# Patient Record
Sex: FEMALE | Race: BLACK OR AFRICAN AMERICAN | NOT HISPANIC OR LATINO | Employment: UNEMPLOYED | URBAN - METROPOLITAN AREA
[De-identification: names, ages, dates, MRNs, and addresses within clinical notes are randomized per-mention and may not be internally consistent; named-entity substitution may affect disease eponyms.]

---

## 2023-02-24 ENCOUNTER — TRANSFERRED RECORDS (OUTPATIENT)
Dept: HEALTH INFORMATION MANAGEMENT | Facility: CLINIC | Age: 33
End: 2023-02-24

## 2023-03-21 ENCOUNTER — HOSPITAL ENCOUNTER (EMERGENCY)
Facility: CLINIC | Age: 33
Discharge: LEFT BEFORE DISCHARGE | End: 2023-03-21

## 2023-03-21 VITALS
DIASTOLIC BLOOD PRESSURE: 77 MMHG | SYSTOLIC BLOOD PRESSURE: 125 MMHG | TEMPERATURE: 98.4 F | OXYGEN SATURATION: 98 % | RESPIRATION RATE: 16 BRPM | HEART RATE: 85 BPM

## 2023-03-21 ASSESSMENT — ACTIVITIES OF DAILY LIVING (ADL)
ADLS_ACUITY_SCORE: 33

## 2023-03-21 NOTE — ED TRIAGE NOTES
PT C/O of possible STD exposure and yeast infection starting in Mid February. Pt friend in the room.       Triage Assessment     Row Name 03/21/23 0254       Triage Assessment (Adult)    Airway WDL WDL       Respiratory WDL    Respiratory WDL WDL       Skin Circulation/Temperature WDL    Skin Circulation/Temperature WDL WDL       Peripheral/Neurovascular WDL    Peripheral Neurovascular WDL WDL       Cognitive/Neuro/Behavioral WDL    Cognitive/Neuro/Behavioral WDL WDL

## 2023-03-30 ENCOUNTER — OFFICE VISIT (OUTPATIENT)
Dept: URGENT CARE | Facility: URGENT CARE | Age: 33
End: 2023-03-30

## 2023-03-30 ENCOUNTER — TELEPHONE (OUTPATIENT)
Dept: URGENT CARE | Facility: URGENT CARE | Age: 33
End: 2023-03-30

## 2023-03-30 VITALS
DIASTOLIC BLOOD PRESSURE: 80 MMHG | SYSTOLIC BLOOD PRESSURE: 138 MMHG | HEART RATE: 111 BPM | RESPIRATION RATE: 18 BRPM | TEMPERATURE: 98.5 F | OXYGEN SATURATION: 98 %

## 2023-03-30 DIAGNOSIS — N89.8 VAGINAL DISCHARGE: ICD-10-CM

## 2023-03-30 DIAGNOSIS — N73.9 PELVIC INFLAMMATORY DISEASE: Primary | ICD-10-CM

## 2023-03-30 DIAGNOSIS — Z11.3 SCREEN FOR STD (SEXUALLY TRANSMITTED DISEASE): ICD-10-CM

## 2023-03-30 DIAGNOSIS — B96.89 BACTERIAL VAGINOSIS: ICD-10-CM

## 2023-03-30 DIAGNOSIS — N76.0 BACTERIAL VAGINOSIS: ICD-10-CM

## 2023-03-30 LAB
ALBUMIN UR-MCNC: NEGATIVE MG/DL
AMORPH CRY #/AREA URNS HPF: ABNORMAL /HPF
APPEARANCE UR: ABNORMAL
BACTERIA #/AREA URNS HPF: ABNORMAL /HPF
BILIRUB UR QL STRIP: NEGATIVE
CLUE CELLS: PRESENT
COLOR UR AUTO: YELLOW
GLUCOSE UR STRIP-MCNC: NEGATIVE MG/DL
HCG UR QL: NEGATIVE
HGB UR QL STRIP: NEGATIVE
KETONES UR STRIP-MCNC: NEGATIVE MG/DL
LEUKOCYTE ESTERASE UR QL STRIP: ABNORMAL
NITRATE UR QL: NEGATIVE
PH UR STRIP: 7.5 [PH] (ref 5–7)
RBC #/AREA URNS AUTO: ABNORMAL /HPF
SP GR UR STRIP: 1.02 (ref 1–1.03)
SQUAMOUS #/AREA URNS AUTO: ABNORMAL /LPF
TRICHOMONAS, WET PREP: ABNORMAL
UROBILINOGEN UR STRIP-ACNC: 0.2 E.U./DL
WBC #/AREA URNS AUTO: ABNORMAL /HPF
WBC'S/HIGH POWER FIELD, WET PREP: ABNORMAL
YEAST, WET PREP: ABNORMAL

## 2023-03-30 PROCEDURE — 99204 OFFICE O/P NEW MOD 45 MIN: CPT | Mod: 25 | Performed by: PHYSICIAN ASSISTANT

## 2023-03-30 PROCEDURE — 81025 URINE PREGNANCY TEST: CPT | Performed by: PHYSICIAN ASSISTANT

## 2023-03-30 PROCEDURE — 87210 SMEAR WET MOUNT SALINE/INK: CPT | Performed by: PHYSICIAN ASSISTANT

## 2023-03-30 PROCEDURE — 87389 HIV-1 AG W/HIV-1&-2 AB AG IA: CPT | Performed by: PHYSICIAN ASSISTANT

## 2023-03-30 PROCEDURE — 87340 HEPATITIS B SURFACE AG IA: CPT | Performed by: PHYSICIAN ASSISTANT

## 2023-03-30 PROCEDURE — 81001 URINALYSIS AUTO W/SCOPE: CPT | Performed by: PHYSICIAN ASSISTANT

## 2023-03-30 PROCEDURE — 87591 N.GONORRHOEAE DNA AMP PROB: CPT | Performed by: PHYSICIAN ASSISTANT

## 2023-03-30 PROCEDURE — 36415 COLL VENOUS BLD VENIPUNCTURE: CPT | Performed by: PHYSICIAN ASSISTANT

## 2023-03-30 PROCEDURE — 87086 URINE CULTURE/COLONY COUNT: CPT | Performed by: PHYSICIAN ASSISTANT

## 2023-03-30 PROCEDURE — 87491 CHLMYD TRACH DNA AMP PROBE: CPT | Performed by: PHYSICIAN ASSISTANT

## 2023-03-30 PROCEDURE — 96372 THER/PROPH/DIAG INJ SC/IM: CPT | Performed by: PHYSICIAN ASSISTANT

## 2023-03-30 PROCEDURE — 86780 TREPONEMA PALLIDUM: CPT | Performed by: PHYSICIAN ASSISTANT

## 2023-03-30 RX ORDER — CEFTRIAXONE SODIUM 1 G
500 VIAL (EA) INJECTION ONCE
Status: COMPLETED | OUTPATIENT
Start: 2023-03-30 | End: 2023-03-30

## 2023-03-30 RX ORDER — METRONIDAZOLE 500 MG/1
500 TABLET ORAL 2 TIMES DAILY
Qty: 28 TABLET | Refills: 0 | Status: SHIPPED | OUTPATIENT
Start: 2023-03-30 | End: 2023-04-13

## 2023-03-30 RX ORDER — DOXYCYCLINE 100 MG/1
100 CAPSULE ORAL 2 TIMES DAILY
Qty: 14 CAPSULE | Refills: 0 | Status: SHIPPED | OUTPATIENT
Start: 2023-03-30 | End: 2023-04-13

## 2023-03-30 RX ADMIN — Medication 500 MG: at 17:50

## 2023-03-30 ASSESSMENT — PAIN SCALES - GENERAL: PAINLEVEL: NO PAIN (0)

## 2023-03-30 NOTE — PROGRESS NOTES
Assessment/Plan:    Patient's exam is concerning for mild pelvic inflammatory disease. Will treat for this with IM Rocephin here and 14 day course of Flagyl and doxycycline. No abdominal tenderness or pelvic pain, no fever/chills.   Advised abstaining from sexual activity until treatment is complete.  Gonorrhea/chlamydia tests pending; pt also elected to do HIV, hepatitis B, and syphilis screening.   Wet prep positive for BV, which Flagyl will treat for. Symptoms seem more severe than would expect from BV alone, however.   UPT negative.   UA with 5-10 WBCs and trace leuk est, do not suspect UTI. Likely due to pyuria. Urine culture ordered to confirm this.    See patient instructions below.    At the end of the encounter, I discussed results, diagnosis, medications. Discussed red flags for immediate return to clinic/ER, as well as indications for follow up if no improvement. Patient understood and agreed to plan. Patient was stable for discharge.    45 minutes was spent preparing for the visit and reviewing the patient's chart; getting a history and performing an exam; counseling and providing education to the patient, family, or caregiver; ordering medicines, tests, or procedures; communicating with other healthcare professionals; documenting information in the medical record; interpreting results and sharing that information with the patient, family, or caregiver; and care coordination.      ICD-10-CM    1. Pelvic inflammatory disease  N73.9 cefTRIAXone (ROCEPHIN) in lidocaine 1% (PF) for IM administration only 500 mg     doxycycline hyclate (VIBRAMYCIN) 100 MG capsule     metroNIDAZOLE (FLAGYL) 500 MG tablet     Urine Culture Aerobic Bacterial - lab collect      2. Screen for STD (sexually transmitted disease)  Z11.3 Chlamydia trachomatis/Neisseria gonorrhoeae by PCR     HIV Antigen Antibody Combo     Treponema Abs w Reflex to RPR and Titer     Hepatitis B surface antigen     HCG qualitative urine     HCG  qualitative urine     HIV Antigen Antibody Combo     Treponema Abs w Reflex to RPR and Titer     Hepatitis B surface antigen      3. Vaginal discharge  N89.8 Wet preparation     UA Macroscopic with reflex to Microscopic and Culture     UA Microscopic with Reflex to Culture      4. Bacterial vaginosis  N76.0 metroNIDAZOLE (FLAGYL) 500 MG tablet    B96.89             Return in about 3 days (around 4/2/2023) for Follow up w/ primary care provider if not better.    Deya Melendez, JEA, NOELLE  [facility]    -----------------------------------------------------------------------------------------------------------------------------------------------------------------------------  HPI:  Alexia Delvalle is a 33 year old female who presents for evaluation of vaginal discharge and itching onset 3 weeks ago. Patient is sexually active, last had sex 2 months ago, didn't use a condom. No known STD exposures. Patient reports no urinary symptoms, genital sores, abdominal pain, fever/chills, or any other symptoms. Her LMP was 2 months ago, has not taken a home pregnancy test.    No past medical history on file.    Vitals:    03/30/23 1559   BP: 138/80   Pulse: 111   Resp: 18   Temp: 98.5  F (36.9  C)   TempSrc: Tympanic   SpO2: 98%       Physical Exam  Vitals and nursing note reviewed.   Pulmonary:      Effort: Pulmonary effort is normal.   Abdominal:      Tenderness: There is no abdominal tenderness.   Genitourinary:     Vagina: Vaginal discharge (purulent) and erythema present.      Cervix: Cervical motion tenderness, discharge (purulent) and erythema present.   Neurological:      Mental Status: She is alert.         Labs/Imaging:  Results for orders placed or performed in visit on 03/30/23 (from the past 24 hour(s))   Wet preparation    Specimen: Vagina; Swab   Result Value Ref Range    Trichomonas Absent Absent    Yeast Absent Absent    Clue Cells Present (A) Absent    WBCs/high power field 2+ (A)  None   UA Macroscopic with reflex to Microscopic and Culture    Specimen: Urine, Midstream   Result Value Ref Range    Color Urine Yellow Colorless, Straw, Light Yellow, Yellow    Appearance Urine Slightly Cloudy (A) Clear    Glucose Urine Negative Negative mg/dL    Bilirubin Urine Negative Negative    Ketones Urine Negative Negative mg/dL    Specific Gravity Urine 1.020 1.003 - 1.035    Blood Urine Negative Negative    pH Urine 7.5 (H) 5.0 - 7.0    Protein Albumin Urine Negative Negative mg/dL    Urobilinogen Urine 0.2 0.2, 1.0 E.U./dL    Nitrite Urine Negative Negative    Leukocyte Esterase Urine Trace (A) Negative   UA Microscopic with Reflex to Culture   Result Value Ref Range    Bacteria Urine Few (A) None Seen /HPF    RBC Urine 0-2 0-2 /HPF /HPF    WBC Urine 5-10 (A) 0-5 /HPF /HPF    Squamous Epithelials Urine Few (A) None Seen /LPF    Amorphous Crystals Urine Moderate (A) None Seen /HPF    Narrative    Urine Culture not indicated   HCG qualitative urine   Result Value Ref Range    hCG Urine Qualitative Negative Negative     No results found for this or any previous visit (from the past 24 hour(s)).      There are no Patient Instructions on file for this visit.

## 2023-03-31 ENCOUNTER — TELEPHONE (OUTPATIENT)
Dept: URGENT CARE | Facility: URGENT CARE | Age: 33
End: 2023-03-31

## 2023-03-31 LAB
HBV SURFACE AG SERPL QL IA: NONREACTIVE
HIV 1+2 AB+HIV1 P24 AG SERPL QL IA: NONREACTIVE
T PALLIDUM AB SER QL: NONREACTIVE

## 2023-03-31 NOTE — TELEPHONE ENCOUNTER
I called the [facility] in [place] on March 31, 2023, at around 10 a.m. I told the pharmacist to change the quantity dispense from 14 capsules to 28 capsules, since the patient will be taking Doxycycline as follows:  Take 1 100-mg  Capsule PO BID x 14 days.      Yovani De La Rosa MD

## 2023-03-31 NOTE — TELEPHONE ENCOUNTER
Pharmacist calling from [facility] regarding the Doxycycline script. It is written BID x 14 days but only dispesning 14 ?    Advised would request clarification from UC staff and get back to you ASAP.    Laxmi RN on 3/31/2023 at 8:32 AM

## 2023-04-01 LAB
C TRACH DNA SPEC QL PROBE+SIG AMP: NEGATIVE
N GONORRHOEA DNA SPEC QL NAA+PROBE: NEGATIVE

## 2023-04-02 LAB — BACTERIA UR CULT: NO GROWTH

## 2023-05-05 ENCOUNTER — TRANSCRIBE ORDERS (OUTPATIENT)
Dept: OTHER | Age: 33
End: 2023-05-05

## 2023-05-05 DIAGNOSIS — N73.1 CHRONIC PID (CHRONIC PELVIC INFLAMMATORY DISEASE): Primary | ICD-10-CM

## 2023-06-01 ENCOUNTER — HEALTH MAINTENANCE LETTER (OUTPATIENT)
Age: 33
End: 2023-06-01

## 2023-11-09 ENCOUNTER — OFFICE VISIT (OUTPATIENT)
Dept: OPHTHALMOLOGY | Facility: CLINIC | Age: 33
End: 2023-11-09
Payer: COMMERCIAL

## 2023-11-09 DIAGNOSIS — Z98.890 H/O LASER PHOTOCOAGULATION OF RETINA: ICD-10-CM

## 2023-11-09 DIAGNOSIS — H52.13 MYOPIA OF BOTH EYES: Primary | ICD-10-CM

## 2023-11-09 PROCEDURE — 92004 COMPRE OPH EXAM NEW PT 1/>: CPT | Performed by: OPTOMETRIST

## 2023-11-09 ASSESSMENT — REFRACTION_MANIFEST
OD_CYLINDER: SPHERE
OS_CYLINDER: +0.50
OD_SPHERE: -5.50
OS_SPHERE: -4.50
OS_AXIS: 180

## 2023-11-09 ASSESSMENT — REFRACTION_CURRENTRX
OS_BRAND: ACUVUE OASYS 1 DAY
OS_SPHERE: -4.00
OD_SPHERE: -5.25
OD_BASECURVE: 8.5
OD_DIAMETER: 14.3
OD_BRAND: ACUVUE OASYS 1 DAY
OS_DIAMETER: 14.3
OS_BASECURVE: 8.5

## 2023-11-09 ASSESSMENT — VISUAL ACUITY
CORRECTION_TYPE: GLASSES
METHOD: SNELLEN - LINEAR
OD_CC: 20/20
OS_CC: 20/20

## 2023-11-09 ASSESSMENT — REFRACTION_WEARINGRX
OS_SPHERE: -4.25
OS_CYLINDER: +0.50
OS_AXIS: 006
OD_SPHERE: -5.25

## 2023-11-09 ASSESSMENT — CONF VISUAL FIELD
OS_INFERIOR_TEMPORAL_RESTRICTION: 0
OD_SUPERIOR_NASAL_RESTRICTION: 0
OS_SUPERIOR_NASAL_RESTRICTION: 0
OD_INFERIOR_NASAL_RESTRICTION: 0
OS_NORMAL: 1
OD_NORMAL: 1
OD_INFERIOR_TEMPORAL_RESTRICTION: 0
OS_SUPERIOR_TEMPORAL_RESTRICTION: 0
OD_SUPERIOR_TEMPORAL_RESTRICTION: 0
OS_INFERIOR_NASAL_RESTRICTION: 0

## 2023-11-09 ASSESSMENT — SLIT LAMP EXAM - LIDS
COMMENTS: NORMAL
COMMENTS: NORMAL

## 2023-11-09 ASSESSMENT — CUP TO DISC RATIO
OS_RATIO: 0.25
OD_RATIO: 0.25

## 2023-11-09 ASSESSMENT — TONOMETRY
OD_IOP_MMHG: 10
OS_IOP_MMHG: 10
IOP_METHOD: ICARE

## 2023-11-09 NOTE — PROGRESS NOTES
A/P  1.) Myopia OU  -Overall doing well in current glasses  -Overminused in current CL's, updated today    2.) H/o bilateral RD with laser repair  -Circa 2021  -Today with stable laser repair, no new holes/tear/detachments  -Reviewed signs/symptoms of RD and need for stat eye eval should they occur    Monitor 1 year dilated eye exam, sooner prn    I have confirmed the patient's CC, HPI and reviewed Past Medical History, Past Surgical History, Social History, Family History, Problem List, Medication List and agree with Tech note.     Kylee Metcalf, OD FAAO FSLS

## 2023-11-09 NOTE — NURSING NOTE
Chief Complaints and History of Present Illnesses   Patient presents with    Annual Eye Exam     Chief Complaint(s) and History of Present Illness(es)       Annual Eye Exam              Laterality: both eyes    Associated symptoms: Negative for eye pain, headache, fatigue and floaters    Pain scale: 0/10              Comments    Patient's last eye exam was for torn retina surgery in 2021. Patient states she wears her dailies for months because she has been overdue for an exam.   COLLIN Faith November 9, 2023 7:55 AM

## 2024-03-26 ENCOUNTER — OFFICE VISIT (OUTPATIENT)
Dept: OBGYN | Facility: CLINIC | Age: 34
End: 2024-03-26
Attending: MIDWIFE
Payer: COMMERCIAL

## 2024-03-26 ENCOUNTER — LAB (OUTPATIENT)
Dept: LAB | Facility: CLINIC | Age: 34
End: 2024-03-26
Attending: MIDWIFE
Payer: COMMERCIAL

## 2024-03-26 VITALS
SYSTOLIC BLOOD PRESSURE: 122 MMHG | HEART RATE: 73 BPM | BODY MASS INDEX: 24.41 KG/M2 | DIASTOLIC BLOOD PRESSURE: 81 MMHG | HEIGHT: 63 IN | WEIGHT: 137.8 LBS

## 2024-03-26 DIAGNOSIS — Z00.00 VISIT FOR PREVENTIVE HEALTH EXAMINATION: Primary | ICD-10-CM

## 2024-03-26 DIAGNOSIS — Z01.419 ENCOUNTER FOR GYNECOLOGICAL EXAMINATION WITHOUT ABNORMAL FINDING: ICD-10-CM

## 2024-03-26 DIAGNOSIS — M62.89 PELVIC FLOOR DYSFUNCTION: ICD-10-CM

## 2024-03-26 DIAGNOSIS — Z12.4 SCREENING FOR MALIGNANT NEOPLASM OF CERVIX: ICD-10-CM

## 2024-03-26 DIAGNOSIS — Z11.3 SCREENING EXAMINATION FOR VENEREAL DISEASE: Primary | ICD-10-CM

## 2024-03-26 DIAGNOSIS — Z13.220 SCREENING FOR LIPOID DISORDERS: ICD-10-CM

## 2024-03-26 DIAGNOSIS — F52.31 ANORGASMIA OF FEMALE: ICD-10-CM

## 2024-03-26 DIAGNOSIS — Z11.3 SCREENING EXAMINATION FOR VENEREAL DISEASE: ICD-10-CM

## 2024-03-26 LAB
ALBUMIN SERPL BCG-MCNC: 4.2 G/DL (ref 3.5–5.2)
ALP SERPL-CCNC: 74 U/L (ref 40–150)
ALT SERPL W P-5'-P-CCNC: 22 U/L (ref 0–50)
ANION GAP SERPL CALCULATED.3IONS-SCNC: 9 MMOL/L (ref 7–15)
AST SERPL W P-5'-P-CCNC: 26 U/L (ref 0–45)
BACTERIAL VAGINOSIS VAG-IMP: POSITIVE
BILIRUB SERPL-MCNC: 0.6 MG/DL
BUN SERPL-MCNC: 10.4 MG/DL (ref 6–20)
CALCIUM SERPL-MCNC: 9.1 MG/DL (ref 8.6–10)
CANDIDA DNA VAG QL NAA+PROBE: NOT DETECTED
CANDIDA GLABRATA / CANDIDA KRUSEI DNA: NOT DETECTED
CHLORIDE SERPL-SCNC: 103 MMOL/L (ref 98–107)
CHOLEST SERPL-MCNC: 118 MG/DL
CREAT SERPL-MCNC: 0.66 MG/DL (ref 0.51–0.95)
DEPRECATED HCO3 PLAS-SCNC: 26 MMOL/L (ref 22–29)
EGFRCR SERPLBLD CKD-EPI 2021: >90 ML/MIN/1.73M2
ERYTHROCYTE [DISTWIDTH] IN BLOOD BY AUTOMATED COUNT: 13.2 % (ref 10–15)
FASTING STATUS PATIENT QL REPORTED: YES
GLUCOSE SERPL-MCNC: 86 MG/DL (ref 70–99)
HBV SURFACE AB SERPL IA-ACNC: >1000 M[IU]/ML
HBV SURFACE AB SERPL IA-ACNC: REACTIVE M[IU]/ML
HBV SURFACE AG SERPL QL IA: NONREACTIVE
HCT VFR BLD AUTO: 42.5 % (ref 35–47)
HCV AB SERPL QL IA: NONREACTIVE
HDLC SERPL-MCNC: 55 MG/DL
HGB BLD-MCNC: 13.9 G/DL (ref 11.7–15.7)
HIV 1+2 AB+HIV1 P24 AG SERPL QL IA: NONREACTIVE
LDLC SERPL CALC-MCNC: 54 MG/DL
MCH RBC QN AUTO: 31.2 PG (ref 26.5–33)
MCHC RBC AUTO-ENTMCNC: 32.7 G/DL (ref 31.5–36.5)
MCV RBC AUTO: 95 FL (ref 78–100)
NONHDLC SERPL-MCNC: 63 MG/DL
PLATELET # BLD AUTO: 361 10E3/UL (ref 150–450)
POTASSIUM SERPL-SCNC: 4 MMOL/L (ref 3.4–5.3)
PROT SERPL-MCNC: 7.4 G/DL (ref 6.4–8.3)
RBC # BLD AUTO: 4.46 10E6/UL (ref 3.8–5.2)
SODIUM SERPL-SCNC: 138 MMOL/L (ref 135–145)
T PALLIDUM AB SER QL: NONREACTIVE
T VAGINALIS DNA VAG QL NAA+PROBE: NOT DETECTED
TRIGL SERPL-MCNC: 44 MG/DL
WBC # BLD AUTO: 4.8 10E3/UL (ref 4–11)

## 2024-03-26 PROCEDURE — 80061 LIPID PANEL: CPT | Performed by: MIDWIFE

## 2024-03-26 PROCEDURE — 87591 N.GONORRHOEAE DNA AMP PROB: CPT | Performed by: MIDWIFE

## 2024-03-26 PROCEDURE — 85027 COMPLETE CBC AUTOMATED: CPT | Performed by: MIDWIFE

## 2024-03-26 PROCEDURE — 87624 HPV HI-RISK TYP POOLED RSLT: CPT | Performed by: MIDWIFE

## 2024-03-26 PROCEDURE — 86803 HEPATITIS C AB TEST: CPT | Performed by: MIDWIFE

## 2024-03-26 PROCEDURE — G0463 HOSPITAL OUTPT CLINIC VISIT: HCPCS | Mod: 25 | Performed by: MIDWIFE

## 2024-03-26 PROCEDURE — 80053 COMPREHEN METABOLIC PANEL: CPT | Performed by: MIDWIFE

## 2024-03-26 PROCEDURE — 87340 HEPATITIS B SURFACE AG IA: CPT | Performed by: MIDWIFE

## 2024-03-26 PROCEDURE — G0145 SCR C/V CYTO,THINLAYER,RESCR: HCPCS | Performed by: MIDWIFE

## 2024-03-26 PROCEDURE — 86780 TREPONEMA PALLIDUM: CPT | Performed by: MIDWIFE

## 2024-03-26 PROCEDURE — 86706 HEP B SURFACE ANTIBODY: CPT | Performed by: MIDWIFE

## 2024-03-26 PROCEDURE — 0352U MULTIPLEX VAGINAL PANEL BY PCR: CPT | Performed by: MIDWIFE

## 2024-03-26 PROCEDURE — 87491 CHLMYD TRACH DNA AMP PROBE: CPT

## 2024-03-26 PROCEDURE — 87389 HIV-1 AG W/HIV-1&-2 AB AG IA: CPT | Performed by: MIDWIFE

## 2024-03-26 PROCEDURE — 99385 PREV VISIT NEW AGE 18-39: CPT | Performed by: MIDWIFE

## 2024-03-26 PROCEDURE — 36415 COLL VENOUS BLD VENIPUNCTURE: CPT | Performed by: MIDWIFE

## 2024-03-26 PROCEDURE — 87491 CHLMYD TRACH DNA AMP PROBE: CPT | Performed by: MIDWIFE

## 2024-03-26 NOTE — LETTER
"3/26/2024       RE: Alexia Delvalle  [address] [facility] [address]     Dear Colleague,    Thank you for referring your patient, Alexia Delvalle, to the [facility] [place] [facility]. Please see a copy of my visit note below.      Progress Note    SUBJECTIVE:  Alexia Delvalle is an 34 year old, , who requests an Annual Preventive Exam.   Concerns today include: would like screening for STI, lipids, blood sugar  pt is fasting would like referral to center for sexual health hx long term  inability to orgasm  told tight pelvic floor. Had prior referral to PT but is interested in restarting that.      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 Hx of PID mild per pt in past   Would like to be screened for BV/yeast due to severe infection last year     Past Medical History:   Diagnosis Date    Breast cyst, left     6th grade removed it    Chlamydia May 2022    Treated    Depressive disorder     Since high school    fibroadenoma     Fibroadenoma removed in right breast in  i think/cyst size of golf ball in left breast removed when i was in the 6th grade    PID (pelvic inflammatory disease) 2023    Mild/due to yeast infection or bv/treated at urgent care    Retinal tear of both eyes       surgically repaired    Substance use     per chart 2023 cocaine, " fentanyl    Urinary tract infection     Had this a few times over the years/treated      Past Surgical History:   Procedure Laterality Date    BREAST SURGERY      Fibroadenoma removed in right breast in 2017 i think/cyst size of golf ball in left breast removed when i was in the 6th grade    EYE SURGERY  December 2020    Retinas were testing in both eyes and lasered to repair    PHQ-2 Score:         3/25/2024     1:31 PM   PHQ-2 ( 1999 Pfizer)   Q1: Little interest or pleasure in doing things 1   Q2: Feeling down, depressed or hopeless 1   PHQ-2 Score 2   Q1: Little interest or pleasure in doing things Several days   Q2: Feeling down, depressed or hopeless Several days   PHQ-2 Score 2      Menstrual History:      3/26/2024    10:40 AM   Menstrual History   LAST MENSTRUAL PERIOD 3/10/2024   Bleeds x 7 days very regular 30-31 days    Using NFP cycle management for preventing pregnancy. Currently abstaining       LPS 2021 NIL     History of abnormal Pap smear: NO - age 30-65 PAP every 5 years with negative HPV co-testing recommended    mammogram current: not applicable  Last Mammogram:   No results found.     Last Colonoscopy:  No results found for this or any previous visit.      HISTORY:  No current outpatient medications on file prior to visit.  No current facility-administered medications on file prior to visit.    No Known Allergies  Immunization History   Administered Date(s) Administered    COVID-19 12+ (2023-24) (MODERNA) 11/05/2023    COVID-19 MONOVALENT 12+ (Pfizer) 05/05/2021, 05/26/2021, 12/21/2021    Flu, Unspecified 11/05/2023    HPV Quadrivalent 05/30/2007, 08/01/2007, 11/30/2007    HepB, Unspecified 1992, 1992    Hepatitis B, Peds 1992, 1992, 1992, 1992, 08/25/2000    Hib, Unspecified 1992, 1992, 1992, 06/30/1993    Historic Hib Hib-titer 1992, 1992, 1992, 06/30/1993    Historical DTP/aP 1992, 1992, 1992,  1993, 1996    Influenza (IIV3) PF 2001, 2002, 10/28/2003, 11/15/2006, 2007, 2008    Influenza Vaccine >6 months,quad, PF 2023    MMR 1993, 2000    Meningococcal ACWY (Menactra ) 2007    OPV, trivalent, live 1992, 1992, 1993, 1993, 1996    TDAP (Adacel,Boostrix) 2008    Td (Adult), Adsorbed 2003       OB History    Para Term  AB Living   0 0 0 0 0 0   SAB IAB Ectopic Multiple Live Births   0 0 0 0 0     Past Medical History:   Diagnosis Date    Breast disorder     Fibroadenoma removed in right breast in  i think/cyst size of golf ball in left breast removed when i was in the 6th grade    Chlamydia May 2022    Treated    Depressive disorder     Since high school    PID (pelvic inflammatory disease) 2023    Mild/due to yeast infection or bv/treated at urgent care    Urinary tract infection     Had this a few times over the years/treated     Past Surgical History:   Procedure Laterality Date    BREAST SURGERY      Fibroadenoma removed in right breast in  i think/cyst size of golf ball in left breast removed when i was in the 6th grade    EYE SURGERY  2020    Retinas were testing in both eyes and lasered to repair     Family History   Problem Relation Age of Onset    Macular Degeneration Mother     Hypertension Mother     Glaucoma Maternal Grandmother     Diabetes Other         Uncle     Social History     Socioeconomic History    Marital status: Single     Spouse name: None    Number of children: None    Years of education: None    Highest education level: None   Tobacco Use    Smoking status: Former     Packs/day: 0.00     Years: 0.50     Additional pack years: 0.00     Total pack years: 0.00     Types: Cigarettes     Start date: 5/15/2023     Quit date: 2023     Years since quittin.3     Passive exposure: Never    Smokeless tobacco: Never    Tobacco comments:     Every  "blue moon/completely stopped   Substance and Sexual Activity    Alcohol use: Not Currently    Drug use: Not Currently     Types: Cocaine, Fentanyl     Comment: No longer using any substances.    Sexual activity: Not Currently     Partners: Male     Birth control/protection: Abstinence, Pull-out method, Other, None       ROS  [unfilled]       No data to display                     No data to display                    EXAM:  Blood pressure 122/81, pulse 73, height 1.6 m (5' 3\"), weight 62.5 kg (137 lb 12.8 oz), last menstrual period 03/10/2024, not currently breastfeeding. Body mass index is 24.41 kg/m .  General - pleasant female in no acute distress.  Skin - no suspicious lesions or rashes  EENT-  PERRLA, euthyroid with out palpable nodules  Neck - supple without lymphadenopathy.  Lungs - clear to auscultation bilaterally.  Heart - regular rate and rhythm without murmur.  Abdomen - soft, nontender, nondistended, no masses or organomegaly noted.  Musculoskeletal - no gross deformities.  Neurological - normal strength, sensation, and mental status.    Breast Exam:  Breast: Without visible skin changes. No dimpling or lesions seen.   Breasts supple, non-tender with palpation, no dominant mass, nodularity, or nipple discharge noted bilaterally. Axillary nodes negative.      Pelvic Exam:  EG/BUS: Normal genital architecture without lesions, erythema or abnormal secretions Bartholin's, Urethra, [place]'s normal   Urethral meatus: normal   Urethra: no masses, tenderness, or scarring   Bladder: no masses or tenderness   Vagina: moist, pink, rugae with creamy, white, and odorless  secretions  Cervix: Nulliparous,, no lesions, and pink, moist, closed, without lesion or CMT  Uterus: anteverted,  and small, smooth, firm, mobile w/o pain  Adnexa: Within normal limits and No masses, nodularity, tenderness  Rectum:anus normal       ASSESSMENT:  Encounter Diagnoses   Name Primary?    Screening for malignant neoplasm of cervix Yes    " Encounter for gynecological examination without abnormal finding     Screening examination for venereal disease     Screening for lipoid disorders     Visit for preventive health examination       (Z00.00) Visit for preventive health examination  (primary encounter diagnosis)    Plan: Center [facility] Sexual Health  Referral,         Comprehensive metabolic panel, Multiplex         Vaginal Panel by PCR, Routine UA with micro         reflex to culture, CBC with Platelets            (Z12.4) Screening for malignant neoplasm of cervix  Plan: Pap Smear Exam [] Do Not Remove, Pelvic         and Breast Exam Procedure [], Pap imaged         thin layer screen with HPV - recommended age 30        - 65 years (select HPV order below)            (Z01.419) Encounter for gynecological examination without abnormal finding    Plan: Pap Smear Exam [] Do Not Remove, Pelvic         and Breast Exam Procedure [], Pap imaged         thin layer screen with HPV - recommended age 30        - 65 years (select HPV order below)          (Z11.3) Screening examination for venereal disease  Plan: Chlamydia trachomatis PCR Swab, Neisseria         gonorrhoeae PCR Swab [IXR0421], HIV Antigen         Antibody Combo [IQC3606], Hepatitis B surface         antigen [NQX962], Hepatitis B Surface Antibody         [[id number]], Hepatitis C antibody [LTA921],         Treponema Abs w Reflex to RPR and Titer         [[id number]], Multiplex Vaginal Panel by PCR            (Z13.220) Screening for lipoid disorders  Plan: Lipid panel reflex to direct LDL Fasting,         Comprehensive metabolic panel    (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 Sexual Health  Referral             Additional teaching done at this visit regarding self breast exam.    Return to clinic in one year.  Follow-up as needed.    Petra Brewster CNM APRN

## 2024-03-26 NOTE — PATIENT INSTRUCTIONS
Thank you for trusting us with your care!     If you need to contact us for questions about:  Symptoms, Scheduling & Medical Questions; Non-urgent (2-3 day response) [place] message, Urgent (needing response today) [phone] (if after 3:30pm next day response)   Prescriptions: Please call your Pharmacy   Billing: [facility] [phone] or [facility]:[phone]   PREVENTIVE HEALTH RECOMMENDATIONS:   Most women need a yearly breast and pelvic exam.    A PAP screen, a test done DURING a pelvic exam, is NO longer recommended yearly.    March 2013, screening guidelines recommended by ACOG for PAP screen are:    1) First pap at age 21.    2) Pap every 3 years until age 30.    3) After age 30, pap every 3 years or Pap with HR HPV screen every 5 years until age 65.  4) Women do NOT need a vaginal Pap screen after a hysterectomy (surgical removal of the uterus) when they have not had cancer.    Exceptions:  1) Yearly pap if HIV+ or immunosuppressed secondary to organ transplant  2) SHARON II-III continue routine screening for 20 years.    I encourage you continue looking for opportunities to choose a healthy lifestyle:       * Choose to eat a heart healthy diet. Check out the FOOD PLATE guidelines at: http://www.choosemyplate.gov/ for helpful hints on weight and cholesterol management.  Balance your caloric intake with exercise to maintain a BMI in the 22 to 26 range. For bone health: Eat calcium-rich foods like yogurt, broccoli or take chewable calcium pills (500 to 600 mg) twice a day with food.       * Exercise for at least an average of 30 minutes a day, 5 days of the week. This will help you control your weight, release stress, and help prevent disease.      * Take a Vitamin D3 supplement daily fall through spring and during summer unless you [id number]' full body sun exposure to skin without sunscreen.      * DO wear sunscreen to prevent skin cancer after the first 15-30 minutes.      * Identify stressors in your  life, find ways to release the stress, and, make time for yourself. PLEASE ask for help if mood changes last longer than two weeks.     * Limit alcohol to one drink per day.  No smoking.  Avoid second hand smoke. If you smoke, ask for help to stop.       *  If you are in a sexual relationship, talk with your partner about possible infection risks and take action to protect yourself from exposure to a sexual infection.    Please request an infection screen for STIs (sexually transmitted infections) if you are less than age 26 OR believe that you may be at risk.     Get a flu shot each year. Get a tetanus shot every 10 years. EVERYONE needs a pertussis (Whooping cough) booster.    See your dentist twice a year for an exam and preventive care cleaning.     Consider the following screen tests:    1) cholesterol test every 5 years.     2) yearly mammogram after age 40 unless you have identified risks.    3) colonoscopy every 10 years after age 50 unless you have identified risks.    4) diabetes blood test screening if you are at risk for diabetes.      Additional information that you may also find helpful:  The Internet now gives us access to LOTS of information -- some of it helpful, research documented and also plenty of harmful, anecdotal information that may not pertain to your situtaion. Consider visiting the following websites for accurate health information:    www.vitamindcouncil.org/ : Info and ongoing research re Vitamin D    www.fairview.org : Up to date and easily searchable information on multiple topics.    www.medlineplus.gov : medication info, interactive tutorials, watch real surgeries online    www.cdc.gov : public health info, travel advisories, epidemics (H1N1)    www.roger/std.org: current research re diagnosis, treatment and prevention of sexually contacted infections.    www.health.state.[place] : [place] dept of heatlh, public health issues in [place], N1N1    www.familydoctor.org : good info from the Academy  of Family Physicians

## 2024-03-26 NOTE — NURSING NOTE
Chief Complaint   Patient presents with    Physical     STI check, BV, yeast check, and fasting labs.       See TAMELA Mccord 3/26/2024

## 2024-03-26 NOTE — PROGRESS NOTES
"  Progress Note    SUBJECTIVE:  Alexia Delvalle is an 34 year old, , who requests an Annual Preventive Exam.   Concerns today include: would like screening for STI, lipids, blood sugar  pt is fasting would like referral to center [facility] hx long term  inability to orgasm  told tight pelvic floor. Had prior referral to PT but is interested in restarting that.      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 Hx of PID mild per pt in past   Would like to be screened for BV/yeast due to severe infection last year     Past Medical History:   Diagnosis Date    Breast cyst, left     6th grade removed it    Chlamydia May 2022    Treated    Depressive disorder     Since high school    fibroadenoma     Fibroadenoma removed in right breast in  i think/cyst size of golf ball in left breast removed when i was in the 6th grade    PID (pelvic inflammatory disease) 2023    Mild/due to yeast infection or bv/treated at urgent care    Retinal tear of both eyes       surgically repaired    Substance use     per chart 2023 cocaine, fentanyl    Urinary tract infection     Had this a few times over the years/treated      Past Surgical History:   Procedure Laterality Date    BREAST SURGERY      Fibroadenoma removed in right breast in  i think/cyst size of golf ball in left breast removed when i was in the 6th grade    EYE SURGERY  2020    " Retinas were testing in both eyes and lasered to repair    PHQ-2 Score:         3/25/2024     1:31 PM   PHQ-2 ( 1999 Pfizer)   Q1: Little interest or pleasure in doing things 1   Q2: Feeling down, depressed or hopeless 1   PHQ-2 Score 2   Q1: Little interest or pleasure in doing things Several days   Q2: Feeling down, depressed or hopeless Several days   PHQ-2 Score 2      Menstrual History:      3/26/2024    10:40 AM   Menstrual History   LAST MENSTRUAL PERIOD 3/10/2024   Bleeds x 7 days very regular 30-31 days    Using NFP cycle management for preventing pregnancy. Currently abstaining       LPS 2021 NIL     History of abnormal Pap smear: NO - age 30-65 PAP every 5 years with negative HPV co-testing recommended    mammogram current: not applicable  Last Mammogram:   No results found.     Last Colonoscopy:  No results found for this or any previous visit.      HISTORY:  No current outpatient medications on file prior to visit.  No current facility-administered medications on file prior to visit.    No Known Allergies  Immunization History   Administered Date(s) Administered    COVID-19 12+ (2023-24) (MODERNA) 11/05/2023    COVID-19 MONOVALENT 12+ (Pfizer) 05/05/2021, 05/26/2021, 12/21/2021    Flu, Unspecified 11/05/2023    HPV Quadrivalent 05/30/2007, 08/01/2007, 11/30/2007    HepB, Unspecified 1992, 1992    Hepatitis B, Peds 1992, 1992, 1992, 1992, 08/25/2000    Hib, Unspecified 1992, 1992, 1992, 06/30/1993    Historic Hib Hib-titer 1992, 1992, 1992, 06/30/1993    Historical DTP/aP 1992, 1992, 1992, 11/30/1993, 05/31/1996    Influenza (IIV3) PF 11/27/2001, 12/13/2002, 10/28/2003, 11/15/2006, 11/30/2007, 12/19/2008    Influenza Vaccine >6 months,quad, PF 11/05/2023    MMR 06/30/1993, 04/18/2000    Meningococcal ACWY (Menactra ) 05/30/2007    OPV, trivalent, live 1992, 1992, 06/03/1993, 06/30/1993, 05/31/1996     TDAP (Adacel,Boostrix) 2008    Td (Adult), Adsorbed 2003       OB History    Para Term  AB Living   0 0 0 0 0 0   SAB IAB Ectopic Multiple Live Births   0 0 0 0 0     Past Medical History:   Diagnosis Date    Breast disorder     Fibroadenoma removed in right breast in  i think/cyst size of golf ball in left breast removed when i was in the 6th grade    Chlamydia May 2022    Treated    Depressive disorder     Since high school    PID (pelvic inflammatory disease) 2023    Mild/due to yeast infection or bv/treated at urgent care    Urinary tract infection     Had this a few times over the years/treated     Past Surgical History:   Procedure Laterality Date    BREAST SURGERY      Fibroadenoma removed in right breast in  i think/cyst size of golf ball in left breast removed when i was in the 6th grade    EYE SURGERY  2020    Retinas were testing in both eyes and lasered to repair     Family History   Problem Relation Age of Onset    Macular Degeneration Mother     Hypertension Mother     Glaucoma Maternal Grandmother     Diabetes Other         Uncle     Social History     Socioeconomic History    Marital status: Single     Spouse name: None    Number of children: None    Years of education: None    Highest education level: None   Tobacco Use    Smoking status: Former     Packs/day: 0.00     Years: 0.50     Additional pack years: 0.00     Total pack years: 0.00     Types: Cigarettes     Start date: 5/15/2023     Quit date: 2023     Years since quittin.3     Passive exposure: Never    Smokeless tobacco: Never    Tobacco comments:     Every blue moon/completely stopped   Substance and Sexual Activity    Alcohol use: Not Currently    Drug use: Not Currently     Types: Cocaine, Fentanyl     Comment: No longer using any substances.    Sexual activity: Not Currently     Partners: Male     Birth control/protection: Abstinence, Pull-out method, Other, None  "      ROS  [unfilled]       No data to display                     No data to display                    EXAM:  Blood pressure 122/81, pulse 73, height 1.6 m (5' 3\"), weight 62.5 kg (137 lb 12.8 oz), last menstrual period 03/10/2024, not currently breastfeeding. Body mass index is 24.41 kg/m .  General - pleasant female in no acute distress.  Skin - no suspicious lesions or rashes  EENT-  PERRLA, euthyroid with out palpable nodules  Neck - supple without lymphadenopathy.  Lungs - clear to auscultation bilaterally.  Heart - regular rate and rhythm without murmur.  Abdomen - soft, nontender, nondistended, no masses or organomegaly noted.  Musculoskeletal - no gross deformities.  Neurological - normal strength, sensation, and mental status.    Breast Exam:  Breast: Without visible skin changes. No dimpling or lesions seen.   Breasts supple, non-tender with palpation, no dominant mass, nodularity, or nipple discharge noted bilaterally. Axillary nodes negative.      Pelvic Exam:  EG/BUS: Normal genital architecture without lesions, erythema or abnormal secretions Bartholin's, Urethra, Adak's normal   Urethral meatus: normal   Urethra: no masses, tenderness, or scarring   Bladder: no masses or tenderness   Vagina: moist, pink, rugae with creamy, white, and odorless  secretions  Cervix: Nulliparous,, no lesions, and pink, moist, closed, without lesion or CMT  Uterus: anteverted,  and small, smooth, firm, mobile w/o pain  Adnexa: Within normal limits and No masses, nodularity, tenderness  Rectum:anus normal       ASSESSMENT:  Encounter Diagnoses   Name Primary?    Screening for malignant neoplasm of cervix Yes    Encounter for gynecological examination without abnormal finding     Screening examination for venereal disease     Screening for lipoid disorders     Visit for preventive health examination       (Z00.00) Visit for preventive health examination  (primary encounter diagnosis)    Plan: [facility] "  Referral,         Comprehensive metabolic panel, Multiplex         Vaginal Panel by PCR, Routine UA with micro         reflex to culture, CBC with Platelets            (Z12.4) Screening for malignant neoplasm of cervix  Plan: Pap Smear Exam [] Do Not Remove, Pelvic         and Breast Exam Procedure [], Pap imaged         thin layer screen with HPV - recommended age 30        - 65 years (select HPV order below)            (Z01.419) Encounter for gynecological examination without abnormal finding    Plan: Pap Smear Exam [] Do Not Remove, Pelvic         and Breast Exam Procedure [], Pap imaged         thin layer screen with HPV - recommended age 30        - 65 years (select HPV order below)          (Z11.3) Screening examination for venereal disease  Plan: Chlamydia trachomatis PCR Swab, Neisseria         gonorrhoeae PCR Swab [FNR4702], HIV Antigen         Antibody Combo [[id number]], Hepatitis B surface         antigen [XRF884], Hepatitis B Surface Antibody         [TIR3448], Hepatitis C antibody [[id number]],         Treponema Abs w Reflex to RPR and Titer         [GFL9033], Multiplex Vaginal Panel by PCR            (Z13.220) Screening for lipoid disorders  Plan: Lipid panel reflex to direct LDL Fasting,         Comprehensive metabolic panel    (F52.31) Anorgasmia of female    Plan: [facility]  Referral             Additional teaching done at this visit regarding self breast exam.    Return to clinic in one year.  Follow-up as needed.    Petra Brewster CNM, APRN

## 2024-03-27 ENCOUNTER — TELEPHONE (OUTPATIENT)
Dept: PSYCHOLOGY | Facility: CLINIC | Age: 34
End: 2024-03-27
Payer: COMMERCIAL

## 2024-03-27 LAB
C TRACH DNA SPEC QL NAA+PROBE: NEGATIVE
C TRACH DNA SPEC QL PROBE+SIG AMP: NEGATIVE
N GONORRHOEA DNA SPEC QL NAA+PROBE: NEGATIVE
N GONORRHOEA DNA SPEC QL NAA+PROBE: NEGATIVE

## 2024-03-27 NOTE — TELEPHONE ENCOUNTER
Date: 3/27/2024    Client Name:  Alexia Delvalle  Preferred Name: Alexia Estrada  MRN: [id number]   : 1990  Age:  34 year old    Presenting Problem / Reason for Assessment:     Unable to achieve orgasm.    Suggested Program:  REST    Interested in Couples/Family Therapy?  No    Any current or past legal concerns we would need to know about?:   No      Patient wishes to be contacted regarding Insurance benefits?:  No    Insurance Benefits to be evaluated.  Note will be entered when validated.    Please Verify Registration    Please send Welcome Packet and document date sent.

## 2024-03-28 DIAGNOSIS — B96.89 BV (BACTERIAL VAGINOSIS): Primary | ICD-10-CM

## 2024-03-28 DIAGNOSIS — N76.0 BV (BACTERIAL VAGINOSIS): Primary | ICD-10-CM

## 2024-03-28 LAB
BKR LAB AP GYN ADEQUACY: NORMAL
BKR LAB AP GYN INTERPRETATION: NORMAL
BKR LAB AP HPV REFLEX: NORMAL
BKR LAB AP LMP: NORMAL
BKR LAB AP PREVIOUS ABNORMAL: NORMAL
PATH REPORT.COMMENTS IMP SPEC: NORMAL
PATH REPORT.COMMENTS IMP SPEC: NORMAL
PATH REPORT.RELEVANT HX SPEC: NORMAL

## 2024-03-28 RX ORDER — METRONIDAZOLE 500 MG/1
500 TABLET ORAL 2 TIMES DAILY
Qty: 14 TABLET | Refills: 0 | Status: SHIPPED | OUTPATIENT
Start: 2024-03-28 | End: 2024-04-04

## 2024-03-31 LAB
HUMAN PAPILLOMA VIRUS 16 DNA: NEGATIVE
HUMAN PAPILLOMA VIRUS 18 DNA: NEGATIVE
HUMAN PAPILLOMA VIRUS FINAL DIAGNOSIS: NORMAL
HUMAN PAPILLOMA VIRUS OTHER HR: NEGATIVE

## 2024-04-09 ENCOUNTER — THERAPY VISIT (OUTPATIENT)
Dept: PHYSICAL THERAPY | Facility: CLINIC | Age: 34
End: 2024-04-09
Attending: MIDWIFE
Payer: COMMERCIAL

## 2024-04-09 DIAGNOSIS — F52.31 ANORGASMIA OF FEMALE: ICD-10-CM

## 2024-04-09 DIAGNOSIS — N81.89 PELVIC FLOOR WEAKNESS: Primary | ICD-10-CM

## 2024-04-09 DIAGNOSIS — M62.89 PELVIC FLOOR DYSFUNCTION: ICD-10-CM

## 2024-04-09 DIAGNOSIS — Z00.00 VISIT FOR PREVENTIVE HEALTH EXAMINATION: ICD-10-CM

## 2024-04-09 PROCEDURE — 97161 PT EVAL LOW COMPLEX 20 MIN: CPT | Mod: GP

## 2024-04-09 PROCEDURE — 97535 SELF CARE MNGMENT TRAINING: CPT | Mod: GP

## 2024-04-09 PROCEDURE — 97110 THERAPEUTIC EXERCISES: CPT | Mod: GP

## 2024-04-09 NOTE — PROGRESS NOTES
PHYSICAL THERAPY EVALUATION  Type of Visit: Evaluation    See electronic medical record for Abuse and Falls Screening details.    Subjective       Presenting condition or subjective complaint: We pelvic floor and anal muscles Pt reports she always felt tense with sexual activity. She feels weak and can't hold urine as long. Feels like she can't do kegels. Had a lot of stress. Last summer had 2 experiences with fecal incontinence while out walking. Can't activate her pelvic floor muscles. Has some pain with the adductor muscle machine in the groin. Had some previous urinary incontinence with UTI.   Date of onset: 04/09/23    Relevant medical history: Bladder or bowel problems; Mental Illness; Vision problems   Dates & types of surgery: Eye surgery to repair retinas 2021; breast surgery left breast to remove cyst 2006, breast surgery right breast to remove fibroadenoma 2017    Prior diagnostic imaging/testing results:       Prior therapy history for the same diagnosis, illness or injury: Yes Pelvic exam and pelvic floor therapy in 2022, at the time, it was only for the pelvic floor not anal muscles/loose bowels incidents  Was told she had a tight pelvic floor    Prior Level of Function  Transfers: Independent  Ambulation: Independent  ADL: Independent  IADL: independent    Living Environment  Social support: With family members   Type of home: Basement   Stairs to enter the home: Yes 3 Is there a railing: Yes   Ramp: No   Stairs inside the home: Yes 9 Is there a railing: Yes   Help at home: None  Equipment owned:       Employment: No     Hobbies/Interests: Gym, Yoga, Walking dog  Started working out the last month - stretching, sauna, strength with free weights and machines, and small amounts of cardio    Patient goals for therapy: Active sex lofe, kegels, hold loose bowels without worrying about random incidents    Pain assessment: Pain denied     Objective      PELVIC EVALUATION  ADDITIONAL HISTORY:  Sex assigned at  birth: Female  Gender identity: Female    Pronouns: She/Her Hers      Bladder History:  Feels bladder filling: No  Triggers for feeling of inability to wait to go to the bathroom: No     How long can you wait to urinate: 2 to 3 hours at most, unless I'm asleep  Gets up at night to urinate: No    Can stop the flow of urine when urinating: Sometimes  Volume of urine usually released: Average   Other issues:   feels empty   Number of bladder infections in last 12 months:   no  Fluid intake per day: 48 to 64 water 8 to 16   coffee a few times a week, occasional sprite  Medications taken for bladder: No     Activities causing urine leak:     none but has urgency  Amount of urine typically leaked:   none but has urgency  Pads used to help with leaking:          Bowel History:  Frequency of bowel movement: Once or twice per day  Consistency of stool: Soft-formed    Ignores the urge to defecate: No  Other bowel issues: Loss of stool  2 episodes last summer, occasional loss of gas  Length of time spent trying to have a bowel movement:      Sexual Function History:  Sexual orientation: Straight    Sexually active: No  Lubrication used: No No  Pelvic pain: Deep penetration (rectal or vaginal)   and initial penetration  Pain or difficulty with orgasms/erection/ejaculation: Yes I don't orgasm, I don't know that I ever have  State of menopause: Perimenopause (have not gone through menopause yet)  Hormone medications: No      Are you currently pregnant: No, Do you get regular exercise: Yes, I do this type of exercise: Stretching, strength training, cardio, walking (5 weeks now)/yoga (10 weeks now), Have you tried pelvic floor strengthening exercises for 4 weeks: No, Do you have any history of trauma that is relevant to your care that you d like to share: No     Discussed reason for referral regarding pelvic health needs and external/internal pelvic floor muscle examination with patient/guardian.  Opportunity provided to ask  questions and verbal consent for assessment and intervention was given.     PAIN: denied   POSTURE: WFL  LUMBAR SCREEN: AROM WNL  HIP SCREEN:  Strength:   Pain: - none + mild ++ moderate +++ severe  Strength Scale: 0-5/5 Left Right   Hip Flexion 4 4   Hip Extension 3+ 3+   Hip Abduction 3+ 3+   Hip Internal Rotation 4 4   Hip External Rotation 3+ 3+   Knee Flexion 4+ 4+   Knee Extension 4+ 4+    ROM: WNL  Functional Strength Testing: Double Leg Squat: Anterior knee translation, Increased trunk lean, and Improper use of glutes/hips  Single Leg Squat: Anterior knee translation, Contralateral hip drop, Increased trunk lean, and Improper use of glutes/hips        PELVIC EXAM  External Visual Inspection:  At rest: Normal  With voluntary pelvic floor contraction: Perineal elevation  Relaxation of PFM: Yes  With intra-abdominal pressure: Cough: No change  Bearing down as defecation: Perineal descent, uncertain at first but then able to lengthen    Integumentary:   Introitus: Unremarkable    External Digital Palpation per Perineum:   Ischiocavernosis: Unremarkable  Transverse perineal: Tenderness  Levator ani: Unremarkable        Internal Digital Palpation:  Per Vagina:  Tenderness  Digital Muscle Performance: P (Power): 2/5  E (Endurance): 5 sec  F (Fast Twitch): able to perform quick flicks slowly and with compensation  Compensations: Abdominals  Relaxation Post-Contraction: Normal      BIOFEEDBACK:  Position: Supine  Surface Electrodes: Perineal      Perianals:       Baseline average 2.7mV      5 quick flicks and 5 sec endurance hold      2 sec on 2 sec off    Assessment & Plan   CLINICAL IMPRESSIONS  Medical Diagnosis: anorgasmia, pelvic floor dysfunction    Treatment Diagnosis: pelvic floor weakness   Impression/Assessment: Patient is a 34 year old female with urinary urgency, loss of gas, and pelvic pain complaints.  The following significant findings have been identified: Pain, Decreased strength, Impaired muscle  performance, and Decreased activity tolerance. These impairments interfere with their ability to perform self care tasks, work tasks, and recreational activities as compared to previous level of function.     Clinical Decision Making (Complexity):  Clinical Presentation: Stable/Uncomplicated  Clinical Presentation Rationale: based on medical and personal factors listed in PT evaluation  Clinical Decision Making (Complexity): Low complexity    PLAN OF CARE  Treatment Interventions:  Modalities: Biofeedback, Ultrasound  Interventions: Manual Therapy, Neuromuscular Re-education, Therapeutic Activity, Therapeutic Exercise, Self-Care/Home Management    Long Term Goals     PT Goal 1  Goal Identifier: urinary urgency  Goal Description: pt will demonstrate improvement in pelvic floor muscle strength and coordination to reduce urinary urgency to <1x/week and have no concern of whether or not she will make it to the bathroom  Rationale: to maximize safety and independence with performance of ADLs and functional tasks  Target Date: 07/02/24  PT Goal 2  Goal Identifier: loss of gas  Goal Description: pt will demonstrate improvement in pelvic floor muscle strength and coordination to reduce loss of gas events to <1x/month during functional activities  Rationale: to maximize safety and independence with performance of ADLs and functional tasks  Target Date: 07/02/24  PT Goal 3  Goal Identifier: pelvic pain penetration  Goal Description: patient will reduce pelvic pain during penetration, erection, orgasm, and ejaculation to <2/10 and/or frequency of less than 1x/month.  Rationale: to maximize safety and independence with performance of ADLs and functional tasks  Target Date: 07/02/24      Frequency of Treatment: 1x/week for 3 weeks then 1x every 3 weeks for 9 weeks  Duration of Treatment: 12 weeks    Recommended Referrals to Other Professionals: Physical Therapy  Education Assessment:   Learner/Method: Patient    Risks and  benefits of evaluation/treatment have been explained.   Patient/Family/caregiver agrees with Plan of Care.     Evaluation Time:     PT Eval, Low Complexity Minutes (02054): 40       Signing Clinician: PETE [facility]                                                                                   OUTPATIENT PHYSICAL THERAPY      PLAN OF TREATMENT FOR OUTPATIENT REHABILITATION   Patient's Last Name, First Name, Alexia Tapia YOB: 1990   Provider's Name   [facility]   Medical Record No.  [id number]     Onset Date: 04/09/23  Start of Care Date: 04/09/24     Medical Diagnosis:  anorgasmia, pelvic floor dysfunction      PT Treatment Diagnosis:  pelvic floor weakness Plan of Treatment  Frequency/Duration: 1x/week for 3 weeks then 1x every 3 weeks for 9 weeks/ 12 weeks    Certification date from 04/09/24 to 07/02/24         See note for plan of treatment details and functional goals     Donna Blankenship, PT                         I CERTIFY THE NEED FOR THESE SERVICES FURNISHED UNDER        THIS PLAN OF TREATMENT AND WHILE UNDER MY CARE     (Physician attestation of this document indicates review and certification of the therapy plan).              Referring Provider:  Akanksha Brewster    Initial Assessment  See Epic Evaluation- Start of Care Date: 04/09/24

## 2024-04-16 ENCOUNTER — THERAPY VISIT (OUTPATIENT)
Dept: PHYSICAL THERAPY | Facility: CLINIC | Age: 34
End: 2024-04-16
Payer: COMMERCIAL

## 2024-04-16 DIAGNOSIS — N81.89 PELVIC FLOOR WEAKNESS: ICD-10-CM

## 2024-04-16 DIAGNOSIS — M62.89 PELVIC FLOOR DYSFUNCTION: ICD-10-CM

## 2024-04-16 DIAGNOSIS — F52.31 ANORGASMIA OF FEMALE: Primary | ICD-10-CM

## 2024-04-16 PROCEDURE — 97530 THERAPEUTIC ACTIVITIES: CPT | Mod: GP

## 2024-04-16 PROCEDURE — 97110 THERAPEUTIC EXERCISES: CPT | Mod: GP

## 2024-04-22 ENCOUNTER — THERAPY VISIT (OUTPATIENT)
Dept: PHYSICAL THERAPY | Facility: CLINIC | Age: 34
End: 2024-04-22
Payer: COMMERCIAL

## 2024-04-22 DIAGNOSIS — N81.89 PELVIC FLOOR WEAKNESS: ICD-10-CM

## 2024-04-22 DIAGNOSIS — F52.31 ANORGASMIA OF FEMALE: Primary | ICD-10-CM

## 2024-04-22 DIAGNOSIS — M62.89 PELVIC FLOOR DYSFUNCTION: ICD-10-CM

## 2024-04-22 PROCEDURE — 97530 THERAPEUTIC ACTIVITIES: CPT | Mod: GP

## 2024-04-22 PROCEDURE — 97140 MANUAL THERAPY 1/> REGIONS: CPT | Mod: GP

## 2024-04-22 PROCEDURE — 97110 THERAPEUTIC EXERCISES: CPT | Mod: GP

## 2024-05-17 ENCOUNTER — THERAPY VISIT (OUTPATIENT)
Dept: PHYSICAL THERAPY | Facility: CLINIC | Age: 34
End: 2024-05-17
Payer: COMMERCIAL

## 2024-05-17 DIAGNOSIS — F52.31 ANORGASMIA OF FEMALE: Primary | ICD-10-CM

## 2024-05-17 DIAGNOSIS — M62.89 PELVIC FLOOR DYSFUNCTION: ICD-10-CM

## 2024-05-17 DIAGNOSIS — N81.89 PELVIC FLOOR WEAKNESS: ICD-10-CM

## 2024-05-17 PROCEDURE — 97110 THERAPEUTIC EXERCISES: CPT | Mod: GP

## 2024-05-17 PROCEDURE — 97140 MANUAL THERAPY 1/> REGIONS: CPT | Mod: GP

## 2024-06-28 ENCOUNTER — THERAPY VISIT (OUTPATIENT)
Dept: PHYSICAL THERAPY | Facility: CLINIC | Age: 34
End: 2024-06-28
Payer: COMMERCIAL

## 2024-06-28 DIAGNOSIS — F52.31 ANORGASMIA OF FEMALE: Primary | ICD-10-CM

## 2024-06-28 DIAGNOSIS — N81.89 PELVIC FLOOR WEAKNESS: ICD-10-CM

## 2024-06-28 DIAGNOSIS — M62.89 PELVIC FLOOR DYSFUNCTION: ICD-10-CM

## 2024-06-28 PROCEDURE — 97112 NEUROMUSCULAR REEDUCATION: CPT | Mod: GP

## 2024-06-28 PROCEDURE — 97530 THERAPEUTIC ACTIVITIES: CPT | Mod: GP

## 2024-06-28 PROCEDURE — 97110 THERAPEUTIC EXERCISES: CPT | Mod: GP

## 2024-06-28 NOTE — PROGRESS NOTES
06/28/24 0500   Appointment Info   Signing clinician's name / credentials Donna Blankenship, PT, DPT   Total/Authorized Visits E+T   Visits Used 5   Medical Diagnosis anorgasmia, pelvic floor dysfunction   PT Tx Diagnosis pelvic floor weakness   Quick Adds Certification   Progress Note/Certification   Start of Care Date 04/09/24   Onset of illness/injury or Date of Surgery 04/09/23   Therapy Frequency 1x every 4 weeks for 8 weeks   Predicted Duration 8 weeks   Certification date from 07/02/24   Progress Note Due Date 08/27/24   Progress Note Completed Date 06/28/24   PT Goal 1   Goal Identifier urinary urgency   Goal Description pt will demonstrate improvement in pelvic floor muscle strength and coordination to reduce urinary urgency to <1x/week and have no concern of whether or not she will make it to the bathroom   Rationale to maximize safety and independence with performance of ADLs and functional tasks   Goal Progress more urgency recently   Target Date 08/27/24   PT Goal 2   Goal Identifier loss of gas   Goal Description pt will demonstrate improvement in pelvic floor muscle strength and coordination to reduce loss of gas events to <1x/month during functional activities   Rationale to maximize safety and independence with performance of ADLs and functional tasks   Goal Progress none in the last week   Target Date 07/02/24   Date Met 06/28/24   PT Goal 3   Goal Identifier pelvic pain penetration   Goal Description patient will reduce pelvic pain during penetration, erection, orgasm, and ejaculation to <2/10 and/or frequency of less than 1x/month.   Rationale to maximize safety and independence with performance of ADLs and functional tasks   Target Date 07/02/24   Goal Progress no tenderness or pain   Date Met 06/28/24   Subjective Report   Subjective Report Pt reports she is struggling with consistency. She has been better this pas week but still isn't doing things as much as she should. She has been doing  lots of breathing and meditation. She noticed she could push a tampon out. She feels like she has had a lot of urgency this week but she thinks it may be due to drinking soda. She decided she is going to drink less soda. Pt reports no pelvic pain recently but also hasn't had intercourse. She reports the wand feels better. No loss of gas.   Objective Measure 1   Objective Measure pelvic   Details strength 2/5, endurance 5 sec, able to bear down, no tenderness, improved ability to relax with cueing   Treatment Interventions (PT)   Interventions Therapeutic Activity;Therapeutic Procedure/Exercise;Neuromuscular Re-education   Therapeutic Procedure/Exercise   Therapeutic Procedures: strength, endurance, ROM, flexibility minutes (25675) 15   PTRx Ther Proc 1 Pelvic Floor Muscle Strengthening Quick Flicks   PTRx Ther Proc 1 - Details improving relaxation between contractions   PTRx Ther Proc 2 Pelvic Floor Muscle Strengthening Basic   PTRx Ther Proc 2 - Details ensuring relaxation prior to contraction, holding 5 sec   PTRx Ther Proc 3 Diaphragmatic Breathing - with Object/Weight   PTRx Ther Proc 3 - Details improved abdominal movement, feeling pelvic floor movement   PTRx Ther Proc 4 Happy Baby   PTRx Ther Proc 4 - Details as needed   PTRx Ther Proc 5 Pelvic Floor Rest Position Seated   PTRx Ther Proc 5 - Details feeling pelvic floor relaxation   Skilled Intervention cueing for dosing and technique, reassessing strength and mobiltiy   Patient Response/Progress pt demonstrated and verabalized understanding   Therapeutic Activity   Therapeutic Activities: dynamic activities to improve functional performance minutes (66556) 15   Therapeutic Activities Ther Act 2;Ther Act 3;Ther Act 4   Ther Act 2 Urge Suppression Techniques   Ther Act 2 - Details continued education on urge suppression, Recognizing triggers that lead to unwanted behaviors. Reducing stress and anxiety through relaxation techniques such as deep breathing.  Distracting attention towards another engaging activity to reduce urge.   Skilled Intervention education   Patient Response/Progress pt verbalized understanding   Neuromuscular Re-education   Neuromuscular re-ed of mvmt, balance, coord, kinesthetic sense, posture, proprioception minutes (49790) 8   Neuro Re-ed 1 pelvic floor relaxation   Neuro Re-ed 1 - Details verbal and tactile cueig for post contraction relaxation, using mirror to visualize and recognize for at home   Skilled Intervention cueing for pelvic floor coordination and relaxation   Patient Response/Progress pt demonstrated understanding   Education   Learner/Method Patient   Plan   Home program ptrx printout   Plan for next session discharge if doing well   Total Session Time   Timed Code Treatment Minutes 38   Total Treatment Time (sum of timed and untimed services) 38         [facility]                                                                                   OUTPATIENT PHYSICAL THERAPY    PLAN OF TREATMENT FOR OUTPATIENT REHABILITATION   Patient's Last Name, First Name, Alexia Tapia YOB: 1990   Provider's Name   [facility]   Medical Record No.  [id number]     Onset Date: 04/09/23  Start of Care Date: 04/09/24     Medical Diagnosis:  anorgasmia, pelvic floor dysfunction      PT Treatment Diagnosis:  pelvic floor weakness Plan of Treatment  Frequency/Duration: 1x every 4 weeks for 8 weeks/ 8 weeks    Certification date from 07/02/24 to 08/27/24         See note for plan of treatment details and functional goals     Donna Blankenship, PT                         I CERTIFY THE NEED FOR THESE SERVICES FURNISHED UNDER        THIS PLAN OF TREATMENT AND WHILE UNDER MY CARE     (Physician attestation of this document indicates review and certification of the therapy plan).              Referring Provider:  Akanksha Brewster    Initial Assessment  See Epic Evaluation-  Start of Care Date: 04/09/24

## 2024-07-08 ASSESSMENT — PATIENT HEALTH QUESTIONNAIRE - PHQ9: SUM OF ALL RESPONSES TO PHQ QUESTIONS 1-9: 6

## 2024-07-09 ENCOUNTER — VIRTUAL VISIT (OUTPATIENT)
Dept: PSYCHOLOGY | Facility: CLINIC | Age: 34
End: 2024-07-09
Payer: COMMERCIAL

## 2024-07-09 DIAGNOSIS — F52.31 ANORGASMIA OF FEMALE: Primary | ICD-10-CM

## 2024-07-09 DIAGNOSIS — F31.75 BIPOLAR 1 DISORDER, DEPRESSED, PARTIAL REMISSION (H): ICD-10-CM

## 2024-07-09 DIAGNOSIS — F41.9 ANXIETY DISORDER, UNSPECIFIED TYPE: ICD-10-CM

## 2024-07-09 PROCEDURE — 90791 PSYCH DIAGNOSTIC EVALUATION: CPT | Mod: 95

## 2024-07-09 ASSESSMENT — PATIENT HEALTH QUESTIONNAIRE - PHQ9
SUM OF ALL RESPONSES TO PHQ QUESTIONS 1-9: 6
SUM OF ALL RESPONSES TO PHQ QUESTIONS 1-9: 6
10. IF YOU CHECKED OFF ANY PROBLEMS, HOW DIFFICULT HAVE THESE PROBLEMS MADE IT FOR YOU TO DO YOUR WORK, TAKE CARE OF THINGS AT HOME, OR GET ALONG WITH OTHER PEOPLE: SOMEWHAT DIFFICULT

## 2024-07-09 NOTE — NURSING NOTE
Current patient location: [address] [place] [id number]    Is the patient currently in the state of [place]? YES    Visit mode:VIDEO    If the visit is dropped, the patient can be reconnected by: VIDEO VISIT: Send to e-mail at: [email or web address]    Will anyone else be joining the visit? NO  (If patient encounters technical issues they should call [phone]    How would you like to obtain your AVS? MyChart    Are changes needed to the allergy or medication list? N/A    Are refills needed on medications prescribed by this physician? NO    Reason for visit: Consult    Court MARTINEZ
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
## 2024-07-10 ENCOUNTER — TELEPHONE (OUTPATIENT)
Dept: PSYCHOLOGY | Facility: CLINIC | Age: 34
End: 2024-07-10
Payer: COMMERCIAL

## 2024-07-10 NOTE — TELEPHONE ENCOUNTER
[facility]    Phone Message    May a detailed message be left on voicemail: yes     Reason for Call: Other: Appointment Cancellation     Action Taken: Left vm for pt to notify her that her session on 9/17 and 9/24 was canceled due to her provider being unavailable that day.  Pt was given the instructions to contact our  us to reschedule her appointment.       Travel Screening: Not Applicable     Date of Service: 7/10/2024 Justino Sigala

## 2024-07-12 NOTE — PROGRESS NOTES
I did not personally see the patient. I reviewed and agree with the assessment and plan of this note.     Charlotte Sutton, PhD, LMFT

## 2024-07-16 ENCOUNTER — THERAPY VISIT (OUTPATIENT)
Dept: PHYSICAL THERAPY | Facility: CLINIC | Age: 34
End: 2024-07-16
Payer: COMMERCIAL

## 2024-07-16 DIAGNOSIS — M62.89 PELVIC FLOOR DYSFUNCTION: ICD-10-CM

## 2024-07-16 DIAGNOSIS — N81.89 PELVIC FLOOR WEAKNESS: ICD-10-CM

## 2024-07-16 DIAGNOSIS — F52.31 ANORGASMIA OF FEMALE: Primary | ICD-10-CM

## 2024-07-16 PROCEDURE — 97530 THERAPEUTIC ACTIVITIES: CPT | Mod: GP

## 2024-07-16 NOTE — PROGRESS NOTES
07/16/24 0500   Appointment Info   Signing clinician's name / credentials Donna Blankenship, PT, DPT   Total/Authorized Visits E+T   Visits Used 6   Medical Diagnosis anorgasmia, pelvic floor dysfunction   PT Tx Diagnosis pelvic floor weakness   Quick Adds Certification   Progress Note/Certification   Start of Care Date 04/09/24   Onset of illness/injury or Date of Surgery 04/09/23   Therapy Frequency 1x every 4 weeks for 8 weeks   Predicted Duration 8 weeks   Certification date from 07/02/24   Certification date to 08/27/24   Progress Note Due Date 08/27/24   Progress Note Completed Date 06/28/24   PT Goal 1   Goal Identifier urinary urgency   Goal Description pt will demonstrate improvement in pelvic floor muscle strength and coordination to reduce urinary urgency to <1x/week and have no concern of whether or not she will make it to the bathroom   Rationale to maximize safety and independence with performance of ADLs and functional tasks   Goal Progress met   Target Date 08/27/24   Date Met 07/16/24   PT Goal 2   Goal Identifier loss of gas   Goal Description pt will demonstrate improvement in pelvic floor muscle strength and coordination to reduce loss of gas events to <1x/month during functional activities   Rationale to maximize safety and independence with performance of ADLs and functional tasks   Goal Progress none in the last week   Target Date 07/02/24   Date Met 06/28/24   PT Goal 3   Goal Identifier pelvic pain penetration   Goal Description patient will reduce pelvic pain during penetration, erection, orgasm, and ejaculation to <2/10 and/or frequency of less than 1x/month.   Rationale to maximize safety and independence with performance of ADLs and functional tasks   Goal Progress no tenderness or pain   Target Date 07/02/24   Date Met 06/28/24   Subjective Report   Subjective Report Pt reports she is doing well. She reports the 5 sec holds are going better and can breathe. She is working on  relaxation with breathing. Pt reports she has been drinking a lot of tea but hasn't created too much urgency. She has had some urgency with increased fluid intake but has been successful with the urge suppresssion. No incontinence. Pt reports she has decreased frequency of wand use due to decreased tenderness.   Treatment Interventions (PT)   Interventions Therapeutic Activity   Therapeutic Activity   Therapeutic Activities: dynamic activities to improve functional performance minutes (25289) 10   Ther Act 1 pt education   Ther Act 1 - Details education on home program moving forward, when to contact if need to return   Education   Learner/Method Patient   Plan   Home program ptrx printout   Plan for next session discharge   Total Session Time   Timed Code Treatment Minutes 10   Total Treatment Time (sum of timed and untimed services) 10           DISCHARGE  Reason for Discharge: Patient has met all goals.    Equipment Issued: HEP    Discharge Plan: Patient to continue home program.    Referring Provider:  Akanksha Brewster

## 2024-08-20 ENCOUNTER — OFFICE VISIT (OUTPATIENT)
Dept: PSYCHOLOGY | Facility: CLINIC | Age: 34
End: 2024-08-20
Payer: COMMERCIAL

## 2024-08-20 DIAGNOSIS — F31.75 BIPOLAR 1 DISORDER, DEPRESSED, PARTIAL REMISSION (H): ICD-10-CM

## 2024-08-20 DIAGNOSIS — F41.9 ANXIETY DISORDER, UNSPECIFIED TYPE: ICD-10-CM

## 2024-08-20 DIAGNOSIS — F52.0 HYPOACTIVE SEXUAL DESIRE: ICD-10-CM

## 2024-08-20 DIAGNOSIS — F52.31 ANORGASMIA OF FEMALE: Primary | ICD-10-CM

## 2024-08-20 PROCEDURE — 90837 PSYTX W PT 60 MINUTES: CPT | Mod: HN
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
## 2024-08-27 ENCOUNTER — OFFICE VISIT (OUTPATIENT)
Dept: PSYCHOLOGY | Facility: CLINIC | Age: 34
End: 2024-08-27
Payer: COMMERCIAL

## 2024-08-27 DIAGNOSIS — F52.31 ANORGASMIA OF FEMALE: Primary | ICD-10-CM

## 2024-08-27 DIAGNOSIS — F52.0 HYPOACTIVE SEXUAL DESIRE: ICD-10-CM

## 2024-08-27 DIAGNOSIS — F31.75 BIPOLAR 1 DISORDER, DEPRESSED, PARTIAL REMISSION (H): ICD-10-CM

## 2024-08-27 DIAGNOSIS — F41.9 ANXIETY DISORDER, UNSPECIFIED TYPE: ICD-10-CM

## 2024-08-27 PROCEDURE — 90837 PSYTX W PT 60 MINUTES: CPT | Mod: HN
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
## 2024-08-29 ENCOUNTER — TELEPHONE (OUTPATIENT)
Dept: PSYCHIATRY | Facility: CLINIC | Age: 34
End: 2024-08-29
Payer: COMMERCIAL

## 2024-08-29 NOTE — TELEPHONE ENCOUNTER
[facility] Call Center    Phone Message    May a detailed message be left on voicemail: no     Reason for Call: Appointment Intake    Provider Name: Darrell Springer    Pt called requesting to establish care and schedule an intake appointment with Dimas for ongoing medication management.    Action Taken: Intake appointment scheduled. Med list form was sent via email on 8/29.    Date of Service: 8/29/2024 Justino Sigala

## 2024-09-04 ENCOUNTER — OFFICE VISIT (OUTPATIENT)
Dept: PSYCHIATRY | Facility: CLINIC | Age: 34
End: 2024-09-04
Payer: COMMERCIAL

## 2024-09-04 VITALS
WEIGHT: 140.4 LBS | SYSTOLIC BLOOD PRESSURE: 116 MMHG | HEART RATE: 53 BPM | HEIGHT: 63 IN | BODY MASS INDEX: 24.88 KG/M2 | DIASTOLIC BLOOD PRESSURE: 83 MMHG

## 2024-09-04 DIAGNOSIS — F31.9 BIPOLAR I DISORDER WITH DEPRESSION (H): Primary | ICD-10-CM

## 2024-09-04 DIAGNOSIS — F41.9 ANXIETY DISORDER, UNSPECIFIED TYPE: ICD-10-CM

## 2024-09-04 PROCEDURE — 99204 OFFICE O/P NEW MOD 45 MIN: CPT | Performed by: STUDENT IN AN ORGANIZED HEALTH CARE EDUCATION/TRAINING PROGRAM

## 2024-09-04 ASSESSMENT — PATIENT HEALTH QUESTIONNAIRE - PHQ9
SUM OF ALL RESPONSES TO PHQ QUESTIONS 1-9: 5
10. IF YOU CHECKED OFF ANY PROBLEMS, HOW DIFFICULT HAVE THESE PROBLEMS MADE IT FOR YOU TO DO YOUR WORK, TAKE CARE OF THINGS AT HOME, OR GET ALONG WITH OTHER PEOPLE: SOMEWHAT DIFFICULT
SUM OF ALL RESPONSES TO PHQ QUESTIONS 1-9: 5
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
## 2024-09-05 ENCOUNTER — TELEPHONE (OUTPATIENT)
Dept: PSYCHIATRY | Facility: CLINIC | Age: 34
End: 2024-09-05
Payer: COMMERCIAL

## 2024-09-10 ENCOUNTER — OFFICE VISIT (OUTPATIENT)
Dept: PSYCHOLOGY | Facility: CLINIC | Age: 34
End: 2024-09-10
Payer: COMMERCIAL

## 2024-09-10 DIAGNOSIS — F41.9 ANXIETY DISORDER, UNSPECIFIED TYPE: ICD-10-CM

## 2024-09-10 DIAGNOSIS — F52.31 ANORGASMIA OF FEMALE: Primary | ICD-10-CM

## 2024-09-10 DIAGNOSIS — F31.75 BIPOLAR 1 DISORDER, DEPRESSED, PARTIAL REMISSION (H): ICD-10-CM

## 2024-09-10 DIAGNOSIS — F52.0 HYPOACTIVE SEXUAL DESIRE: ICD-10-CM

## 2024-09-10 PROCEDURE — 90837 PSYTX W PT 60 MINUTES: CPT | Mod: HN
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
## 2024-09-23 ENCOUNTER — VIRTUAL VISIT (OUTPATIENT)
Dept: PHARMACY | Facility: CLINIC | Age: 34
End: 2024-09-23
Attending: STUDENT IN AN ORGANIZED HEALTH CARE EDUCATION/TRAINING PROGRAM
Payer: COMMERCIAL

## 2024-09-23 DIAGNOSIS — Z78.9 TAKES DIETARY SUPPLEMENTS: ICD-10-CM

## 2024-09-23 DIAGNOSIS — F31.9 BIPOLAR AFFECTIVE DISORDER, REMISSION STATUS UNSPECIFIED (H): Primary | ICD-10-CM

## 2024-09-23 PROCEDURE — 99605 MTMS BY PHARM NP 15 MIN: CPT | Mod: 93 | Performed by: PHARMACIST

## 2024-09-23 RX ORDER — MULTIVIT WITH MINERALS/LUTEIN
1000 TABLET ORAL DAILY
COMMUNITY

## 2024-09-23 RX ORDER — GLUCOSAMINE/D3/BOSWELLIA SERRA 1500MG-400
10000 TABLET ORAL DAILY
COMMUNITY

## 2024-09-23 NOTE — PROGRESS NOTES
"Medication Therapy Management (MTM) Encounter    ASSESSMENT:                            Medication Adherence/Access: No issues identified    Bipolar Disorder:   As she has tried a couple antipsychotic options that are both generally considered to be lower risk for sexual side effects, would consider a mood stabilizer instead. Reviewed options of lithium and lamotrigine, the latter being less likely beneficial for acute jaxson. Prefer to avoid valproic acid due to teratogenicity, although patient actively tracks her cycle for birth control. Discussed potential side effects and monitoring for each one and she is open to either option.    Supplements:  Stable. Continue current medication.    PLAN:                            I will talk with provider about med options.     Follow-up: psychiatry 10/10    SUBJECTIVE/OBJECTIVE:                          Alexia Delvalle is a 34 year old female seen for an initial visit. She was referred to me from psychiatry, Darrell Springer PA-C.      Reason for visit: med review.    Allergies/ADRs: Reviewed in chart  Past Medical History: Reviewed in chart  Tobacco: She reports that she quit smoking about 9 months ago. Her smoking use included cigarettes. She started smoking about 16 months ago. She has never been exposed to tobacco smoke. She has never used smokeless tobacco.  Alcohol: not currently using    Medication Adherence/Access: no issues reported    Bipolar Disorder:   -no current medications    Patient referred to review medication options to treat bipolar disorder that are less likely to cause sexual side effects. Pt reported that issue has occurred for a long time and she has very low sexual desire/arousal at baseline, which is worsened by some past psychiatric medication.     Patient reported that sexual arousal is still quite low, even after stopping lurasidone, but feels it \"a little bit.\" She reported that work routine is quite helpful for mood management. Outside of work, " "she either feels \"anti-social,\" noted as being isolative and disengaged and other times she feels \"over elated.\" It seems to swing day-to-day, but has been having more \"manic\" days lately.  She notices spending more money, making commitments that she later realizes are difficult to keep. Has been staying up until 2am, sometimes sleeping only 4 hours, then goes to gym. After a couple days, she will \"crash\" and sleep longer overnight.    She does not have birth control, but tracks her cycle closely     Experienced manic episode earlier this summer that lasted two days.    PAST PSYCHOTROPIC MEDICATIONS: (per psychiatry note, added info today)  Olanzapine: very groggy  Latuda: sexual dysfunction  Gabapentin: denies  Seroquel: fatigue, very helpful for sleep  Haldol: akathisia   Abilify: mood was elevated and erratic, high strung, hypersensitive, and reactive. Notes she was under stress and tried to use it overdose. mental health: experienced side effects: exacerbated bipolar. Medication made things worse. Tried to control symptoms by taking a lot of her abilify.   Adderall: not focused       Supplements:   -Vitamin C 1000mg daily  -Vitamin D 125mcg daily  -Vitamin E daily  -biotin 10,000mcg daily  -multivitamin with iron and folate daily  No reported issues at this time.        ----------------      I spent 30 minutes with this patient today. A copy of the visit note was provided to the patient's provider(s).    A summary of these recommendations was sent via clinic portal.    Sierra Clemons PharmD  Medication Therapy Management Pharmacist  [facility]      Telemedicine Visit Details  The patient's medications can be safely assessed via a telemedicine encounter.  Type of service:  Telephone visit  Originating Location (pt. Location): Home    Distant Location (provider location):  Off-site  Start Time:  8:12am  End Time:  8:42am     Medication Therapy Recommendations  No medication " therapy recommendations to display

## 2024-09-23 NOTE — Clinical Note
Saw this patient today and discussed potential for lithium or lamotrigine and she is open to either.  She is interested in starting medication prior to your next appointment, if possible. Lithium may be more helpful if she is having more issues with jaxson. Let me know if you'd like for me to place any orders or send update to the patient. Sierra

## 2024-09-23 NOTE — PATIENT INSTRUCTIONS
"Recommendations from today's MTM visit:                                                    MTM (medication therapy management) is a service provided by a clinical pharmacist designed to help you get the most of out of your medicines.   Today we reviewed what your medicines are for, how to know if they are working, that your medicines are safe and how to make your medicine regimen as easy as possible.      I will talk with provider about med options and get back with you    Follow-up: 6 weeks    It was great speaking with you today.  I value your experience and would be very thankful for your time in providing feedback in our clinic survey. In the next few days, you may receive an email or text message from [employer] with a link to a survey related to your  clinical pharmacist.\"     To schedule another MTM appointment, please call the clinic directly or you may call the MTM scheduling line at [phone] or toll-free at [phone].     My Clinical Pharmacist's contact information:                                                      Please feel free to contact me with any questions or concerns you have.      Sierra Clemons, PharmD  Medication Therapy Management Pharmacist  [facility]    "

## 2024-09-25 ENCOUNTER — OFFICE VISIT (OUTPATIENT)
Dept: PSYCHOLOGY | Facility: CLINIC | Age: 34
End: 2024-09-25
Payer: COMMERCIAL

## 2024-09-25 DIAGNOSIS — F41.9 ANXIETY DISORDER, UNSPECIFIED TYPE: ICD-10-CM

## 2024-09-25 DIAGNOSIS — F31.75 BIPOLAR 1 DISORDER, DEPRESSED, PARTIAL REMISSION (H): ICD-10-CM

## 2024-09-25 DIAGNOSIS — F52.31 ANORGASMIA OF FEMALE: Primary | ICD-10-CM

## 2024-09-25 DIAGNOSIS — F52.0 HYPOACTIVE SEXUAL DESIRE: ICD-10-CM

## 2024-09-25 PROCEDURE — 90837 PSYTX W PT 60 MINUTES: CPT | Mod: HN
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
## 2024-10-02 ENCOUNTER — MYC MEDICAL ADVICE (OUTPATIENT)
Dept: PSYCHIATRY | Facility: CLINIC | Age: 34
End: 2024-10-02
Payer: COMMERCIAL

## 2024-10-02 DIAGNOSIS — F31.9 BIPOLAR I DISORDER WITH DEPRESSION (H): Primary | ICD-10-CM

## 2024-10-03 RX ORDER — LITHIUM CARBONATE 300 MG/1
300 TABLET, FILM COATED, EXTENDED RELEASE ORAL 2 TIMES DAILY
Qty: 60 TABLET | Refills: 0 | Status: SHIPPED | OUTPATIENT
Start: 2024-10-03 | End: 2024-11-02

## 2024-10-08 ENCOUNTER — OFFICE VISIT (OUTPATIENT)
Dept: PSYCHOLOGY | Facility: CLINIC | Age: 34
End: 2024-10-08
Payer: COMMERCIAL

## 2024-10-08 DIAGNOSIS — F52.31 ANORGASMIA OF FEMALE: Primary | ICD-10-CM

## 2024-10-08 DIAGNOSIS — F31.75 BIPOLAR 1 DISORDER, DEPRESSED, PARTIAL REMISSION (H): ICD-10-CM

## 2024-10-08 DIAGNOSIS — F41.9 ANXIETY DISORDER, UNSPECIFIED TYPE: ICD-10-CM

## 2024-10-08 DIAGNOSIS — F52.0 HYPOACTIVE SEXUAL DESIRE: ICD-10-CM

## 2024-10-08 PROCEDURE — 90837 PSYTX W PT 60 MINUTES: CPT | Mod: HN
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
## 2024-10-10 ENCOUNTER — OFFICE VISIT (OUTPATIENT)
Dept: PSYCHIATRY | Facility: CLINIC | Age: 34
End: 2024-10-10
Payer: COMMERCIAL

## 2024-10-10 VITALS
HEART RATE: 51 BPM | DIASTOLIC BLOOD PRESSURE: 80 MMHG | WEIGHT: 140.2 LBS | BODY MASS INDEX: 24.84 KG/M2 | SYSTOLIC BLOOD PRESSURE: 120 MMHG

## 2024-10-10 DIAGNOSIS — F31.9 BIPOLAR I DISORDER WITH DEPRESSION (H): ICD-10-CM

## 2024-10-10 DIAGNOSIS — Z79.899 ENCOUNTER FOR LITHIUM MONITORING: Primary | ICD-10-CM

## 2024-10-10 DIAGNOSIS — Z51.81 ENCOUNTER FOR LITHIUM MONITORING: Primary | ICD-10-CM

## 2024-10-10 DIAGNOSIS — F41.9 ANXIETY DISORDER, UNSPECIFIED TYPE: ICD-10-CM

## 2024-10-10 PROCEDURE — G2211 COMPLEX E/M VISIT ADD ON: HCPCS | Performed by: STUDENT IN AN ORGANIZED HEALTH CARE EDUCATION/TRAINING PROGRAM

## 2024-10-10 PROCEDURE — 99214 OFFICE O/P EST MOD 30 MIN: CPT | Performed by: STUDENT IN AN ORGANIZED HEALTH CARE EDUCATION/TRAINING PROGRAM
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
## 2024-11-10 SDOH — HEALTH STABILITY: PHYSICAL HEALTH: ON AVERAGE, HOW MANY DAYS PER WEEK DO YOU ENGAGE IN MODERATE TO STRENUOUS EXERCISE (LIKE A BRISK WALK)?: 6 DAYS

## 2024-11-10 SDOH — HEALTH STABILITY: PHYSICAL HEALTH: ON AVERAGE, HOW MANY MINUTES DO YOU ENGAGE IN EXERCISE AT THIS LEVEL?: 150+ MIN

## 2024-11-10 ASSESSMENT — SOCIAL DETERMINANTS OF HEALTH (SDOH): HOW OFTEN DO YOU GET TOGETHER WITH FRIENDS OR RELATIVES?: NEVER

## 2024-11-11 ENCOUNTER — OFFICE VISIT (OUTPATIENT)
Dept: FAMILY MEDICINE | Facility: CLINIC | Age: 34
End: 2024-11-11
Attending: FAMILY MEDICINE
Payer: COMMERCIAL

## 2024-11-11 ENCOUNTER — LAB (OUTPATIENT)
Dept: LAB | Facility: CLINIC | Age: 34
End: 2024-11-11
Attending: FAMILY MEDICINE
Payer: COMMERCIAL

## 2024-11-11 VITALS
BODY MASS INDEX: 24.06 KG/M2 | HEART RATE: 62 BPM | SYSTOLIC BLOOD PRESSURE: 121 MMHG | HEIGHT: 63 IN | WEIGHT: 135.8 LBS | DIASTOLIC BLOOD PRESSURE: 80 MMHG

## 2024-11-11 DIAGNOSIS — Z11.3 SCREENING FOR STDS (SEXUALLY TRANSMITTED DISEASES): Primary | ICD-10-CM

## 2024-11-11 DIAGNOSIS — Z11.3 SCREENING FOR STDS (SEXUALLY TRANSMITTED DISEASES): ICD-10-CM

## 2024-11-11 DIAGNOSIS — R68.82 LOW LIBIDO: ICD-10-CM

## 2024-11-11 LAB
BACTERIAL VAGINOSIS VAG-IMP: NEGATIVE
CANDIDA DNA VAG QL NAA+PROBE: NOT DETECTED
CANDIDA GLABRATA / CANDIDA KRUSEI DNA: NOT DETECTED
HAV IGM SERPL QL IA: NONREACTIVE
HBV SURFACE AB SERPL IA-ACNC: >1000 M[IU]/ML
HBV SURFACE AB SERPL IA-ACNC: REACTIVE M[IU]/ML
HBV SURFACE AG SERPL QL IA: NONREACTIVE
HCV AB SERPL QL IA: NONREACTIVE
HIV 1+2 AB+HIV1 P24 AG SERPL QL IA: NONREACTIVE
T PALLIDUM AB SER QL: NONREACTIVE
T VAGINALIS DNA VAG QL NAA+PROBE: NOT DETECTED

## 2024-11-11 PROCEDURE — 0352U MULTIPLEX VAGINAL PANEL BY PCR: CPT | Performed by: FAMILY MEDICINE

## 2024-11-11 PROCEDURE — 86803 HEPATITIS C AB TEST: CPT

## 2024-11-11 PROCEDURE — 87591 N.GONORRHOEAE DNA AMP PROB: CPT | Performed by: FAMILY MEDICINE

## 2024-11-11 PROCEDURE — G0463 HOSPITAL OUTPT CLINIC VISIT: HCPCS | Performed by: FAMILY MEDICINE

## 2024-11-11 PROCEDURE — 87491 CHLMYD TRACH DNA AMP PROBE: CPT | Performed by: FAMILY MEDICINE

## 2024-11-11 PROCEDURE — 86709 HEPATITIS A IGM ANTIBODY: CPT

## 2024-11-11 PROCEDURE — 86481 TB AG RESPONSE T-CELL SUSP: CPT

## 2024-11-11 PROCEDURE — 86780 TREPONEMA PALLIDUM: CPT

## 2024-11-11 PROCEDURE — 86706 HEP B SURFACE ANTIBODY: CPT

## 2024-11-11 PROCEDURE — 36415 COLL VENOUS BLD VENIPUNCTURE: CPT

## 2024-11-11 PROCEDURE — 87340 HEPATITIS B SURFACE AG IA: CPT

## 2024-11-11 PROCEDURE — 87389 HIV-1 AG W/HIV-1&-2 AB AG IA: CPT

## 2024-11-11 NOTE — PATIENT INSTRUCTIONS
Blood work for STI's  Continue with sexual therapist  Vitamin with folate         Thank you for trusting us with your care!   Please be aware, if you are on Mychart, you may see your results prior to your providers review. If labs are abnormal, we will call or message you on Mychart with a follow up plan.    If you need to contact us for questions about:  Symptoms, Scheduling & Medical Questions; Non-urgent (2-3 day response) [employer] message, Urgent (needing response today) [phone] (if after 3:30pm next day response)   Prescriptions: Please call your Pharmacy   Billing: [facility] [phone] or  Physicians:[phone]

## 2024-11-12 LAB
C TRACH DNA SPEC QL NAA+PROBE: NEGATIVE
GAMMA INTERFERON BACKGROUND BLD IA-ACNC: 0.13 IU/ML
M TB IFN-G BLD-IMP: NEGATIVE
M TB IFN-G CD4+ BCKGRND COR BLD-ACNC: 9.87 IU/ML
MITOGEN IGNF BCKGRD COR BLD-ACNC: -0.03 IU/ML
MITOGEN IGNF BCKGRD COR BLD-ACNC: -0.05 IU/ML
N GONORRHOEA DNA SPEC QL NAA+PROBE: NEGATIVE
QUANTIFERON MITOGEN: 10 IU/ML
QUANTIFERON NIL TUBE: 0.13 IU/ML
QUANTIFERON TB1 TUBE: 0.1 IU/ML
QUANTIFERON TB2 TUBE: 0.08

## 2024-11-13 ENCOUNTER — OFFICE VISIT (OUTPATIENT)
Dept: PSYCHIATRY | Facility: CLINIC | Age: 34
End: 2024-11-13
Payer: COMMERCIAL

## 2024-11-13 DIAGNOSIS — B96.89 BV (BACTERIAL VAGINOSIS): Primary | ICD-10-CM

## 2024-11-13 DIAGNOSIS — N76.0 BV (BACTERIAL VAGINOSIS): Primary | ICD-10-CM

## 2024-11-13 DIAGNOSIS — F31.9 BIPOLAR I DISORDER WITH DEPRESSION (H): Primary | ICD-10-CM

## 2024-11-13 RX ORDER — METRONIDAZOLE 500 MG/1
500 TABLET ORAL 2 TIMES DAILY
Qty: 14 TABLET | Refills: 0 | Status: SHIPPED | OUTPATIENT
Start: 2024-11-13 | End: 2024-11-20

## 2024-11-13 RX ORDER — ARIPIPRAZOLE 10 MG/1
5 TABLET ORAL DAILY
Qty: 15 TABLET | Refills: 0 | Status: SHIPPED | OUTPATIENT
Start: 2024-11-13 | End: 2024-12-13

## 2024-11-13 RX ORDER — ARIPIPRAZOLE 5 MG/1
5 TABLET ORAL DAILY
Qty: 30 TABLET | Refills: 0 | Status: SHIPPED | OUTPATIENT
Start: 2024-11-13 | End: 2024-11-13

## 2024-11-13 NOTE — PROGRESS NOTES
PSYCHIATRIC MEDICATION FOLLOW UP APPT     Name:  Alexia Delvalle  : 1990    ***    Patient attended the phone/video session {[facility] ATTENDANCE:168745}.    Last seen for outpatient psychiatry {[facility] LAST VISIT:718661} on ***.      FOLLOWING PLAN PUT INTO PLACE: ***    INTERIM HISTORY     COMMUNICATIONS FROM PATIENT VIA:  ***    RECORDS AVAILABLE FOR REVIEW: {RPRECORDS:937484}.   HISTORY OF PRESENT ILLNESS   Today: at night. Urinating a lot.   FAMILY, MEDICAL, SURGICAL HISTORY REVIEWED.  MEDICATION HAVE BEEN REVIEWED AND ARE CURRENT TO THE BEST OF MY KNOWLEDGE AND ABILITY.  ***    MEDICATIONS                                                                                                Current Outpatient Medications   Medication Sig Dispense Refill    Biotin 65128 MCG TABS Take 10,000 mcg by mouth daily.      cholecalciferol (VITAMIN D3) 125 mcg (5000 units) capsule Take 125 mcg by mouth daily.      lithium ER (LITHOBID) 300 MG CR tablet Take 1 tablet (300 mg) by mouth 2 times daily. 60 tablet 0    Multiple Vitamin (MULTIVITAMIN ADULT PO) Take 1 tablet by mouth daily. With iron and folate      vitamin C (ASCORBIC ACID) 1000 MG TABS Take 1,000 mg by mouth daily.      Vitamin Mixture (VITAMIN E COMPLETE PO) Take 670 Units by mouth daily.       No current facility-administered medications for this visit.           Past Psychotropic Medications   {NEWER ANTIDEP:499400}  {OLDER ANTIDEP:899594}  {MOOD STABILIZERS:607686}  {NEWER ANTIPSYCH:204022}  {OLDER ANTIPSYCH:512548}  {TRANQ:669875}  {SEDS / HYPNOTS:164932}  {STIMULANTS / ADHD MEDS:267122}     Medication Max Dose (mg) Dates / Duration Helpful? DC Reason / Adverse Effects?                                                                              PSYCHOTROPIC DRUG INTERACTIONS                                         {psyDDI:847054}    MANAGEMENT:  {di:844267}    TODAY PATIENT REPORTS THE FOLLOWING PSYCHIATRIC ROS:   Per [facility], [place], during  "today's team-based visit:  \"***\"     EXERCISE: {RPEXERCISE:771084}  SIDE EFFECTS: *** tolerating medications without reported side effects  COMPLIANCE: *** states Adherent to medication regimen  REPORTS THE FOLLOWING NEW MEDICAL ISSUES: *** none    PROBLEM: DEPRESSION: {Rpsymptomstatus:231732} ***      9/4/2024     7:55 AM   Last PHQ-9   1.  Little interest or pleasure in doing things 0    2.  Feeling down, depressed, or hopeless 0    3.  Trouble falling or staying asleep, or sleeping too much 1    4.  Feeling tired or having little energy 1    5.  Poor appetite or overeating 1    6.  Feeling bad about yourself 0    7.  Trouble concentrating 1    8.  Moving slowly or restless 1    Q9: Thoughts of better off dead/self-harm past 2 weeks 0    PHQ-9 Total Score 5       Patient-reported         7/8/2024    11:42 AM 7/9/2024    10:46 AM 9/4/2024     7:55 AM   PHQ-9 SCORE   PHQ-9 Total Score MyChart  6 (Mild depression) 5 (Mild depression)   PHQ-9 Total Score 6 6 5     PHQ9 score is {RPPHQ9:682813}  Suicidal ideation:  {YES / NO:077599::\"No\"}     PROBLEM: ANXIETY: {Rpsymptomstatus:114064}. ***  GAD7 score is {RPGAD7:923701}       No data to display                PROBLEM: CHRONIC SUICIDAL IDEATIONS: current: {YES / NO:221899::\"No\"}     PROBLEM: SLEEP/INSOMNIA: {Rpsymptomstatus:613755}  {RPSLEEP:198533}.     PERTINENT PAST MEDICAL AND SURGICAL HISTORY     Past Medical History:   Diagnosis Date    Anorgasmia of female     Breast cyst, left     6th grade removed it    Chlamydia May 2022    Treated    Depressive disorder     Since high school    fibroadenoma     Fibroadenoma removed in right breast in 2017 i think/cyst size of golf ball in left breast removed when i was in the 6th grade    Migraines     PID (pelvic inflammatory disease) March 2023    Mild/due to yeast infection or bv/treated at urgent care    Retinal tear of both eyes     2020  surgically repaired    Substance use     per chart 5/2023 cocaine, fentanyl    " "Urinary tract infection     Had this a few times over the years/treated       VITALS     BP Readings from Last 1 Encounters:   11/11/24 121/80     Pulse Readings from Last 1 Encounters:   11/11/24 62     Wt Readings from Last 1 Encounters:   11/11/24 61.6 kg (135 lb 12.8 oz)     Ht Readings from Last 1 Encounters:   11/11/24 1.6 m (5' 3\")     Estimated body mass index is 24.06 kg/m  as calculated from the following:    Height as of 11/11/24: 1.6 m (5' 3\").    Weight as of 11/11/24: 61.6 kg (135 lb 12.8 oz).    LABS & IMAGING                                                                                                                ***  Recent Labs   Lab Test 03/26/24  1134   WBC 4.8   HGB 13.9   HCT 42.5   MCV 95        Recent Labs   Lab Test 03/26/24  1134      POTASSIUM 4.0   CHLORIDE 103   CO2 26   GLC 86   SRI 9.1   BUN 10.4   CR 0.66   GFRESTIMATED >90   ALBUMIN 4.2   PROTTOTAL 7.4   AST 26   ALT 22   ALKPHOS 74   BILITOTAL 0.6     Recent Labs   Lab Test 03/26/24  1134   CHOL 118   LDL 54   HDL 55   TRIG 44     No lab results found.  No results found for: \"XNN353\", \"LAIB545\", \"RSAE49BKBVU\", \"VITD3\", \"D2VIT\", \"D3VIT\", \"DTOT\", \"VH69135022\", \"LK03810540\", \"VJ44106726\", \"ZG99773383\", \"OO32594999\", \"[id number]\"     ALLERGY & IMMUNIZATIONS     No Known Allergies    MEDICAL REVIEW OF SYSTEMS:   Ten system review was completed with pertinent positives noted     MENTAL STATUS EXAM:   ***  ***    DSM 5 DIAGNOSIS:   ***    MEDICAL COMORBIDITY IMPACTING CLINICAL PICTURE: {RPCOMORBIDITIES:422948::\"None noted\"}.  Known issue that I take into account for their medical decisions, no current exacerbations or new concerns      ASSESSMENT AND PLAN      Problem List as of 11/13/2024 Reviewed: 3/30/2023  4:54 PM by Deya Melendez PA-C   None       HIGH RISK MEDICATION:  {YES / NO:513251::\"No\"}      ***    ADMINISTRATIVE BILLING:   Level of Medical Decision Making:   {Acuity / problems addressed " "(Optional):877798::\"- At least 1 chronic problem that is not stable\"}  {Risk of complications / morbidity / mortality (Optional):168711::\"- Engaged in prescription drug management during visit (discussed any medication benefits, side effects, alternatives, etc.)\"}  {Complexity / amount of data reviewed / analyzed (Optional):669228::\" \"}  {   Must meet 2 out of 3 of the above MDM elements to bill at the specified level     For help more info about elements / criteria, click here-> MDM Help Grid     *This blue text will disappear automatically when note is signed, no need to delete*:701785}      Patient Status:  {RPSTATUS:956837}    Signed:   Darrell Springer PA-C        "   Language: intact    Recent and Remote Memory:  Intact to interview. Not formally assessed. No amnesia.   Fund of Knowledge: appropriate        DSM 5 DIAGNOSIS:   Diagnoses and all orders for this visit:    Bipolar I disorder with depression (H)  -     Discontinue: ARIPiprazole (ABILIFY) 5 MG tablet; Take 1 tablet (5 mg) by mouth daily.  -     ARIPiprazole (ABILIFY) 10 MG tablet; Take 0.5 tablets (5 mg) by mouth daily.         MEDICAL COMORBIDITY IMPACTING CLINICAL PICTURE: None noted.  Known issue that I take into account for their medical decisions, no current exacerbations or new concerns      ASSESSMENT AND PLAN      Diagnoses and all orders for this visit:  Discontinuing latuda as this has caused frequent urination. Switching to 5mg abilify. We went over safety concern regarding this medication as she has overdosed on it in the past. She stated in exam that she took more than intended to help control her manic symptoms and she now has therapy and supports in her life and she contracted for safety. We did plan to add a stimulant for ADHD once her mood is stable.   Bipolar I disorder with depression (H)  -     Discontinue: ARIPiprazole (ABILIFY) 5 MG tablet; Take 1 tablet (5 mg) by mouth daily.  -     ARIPiprazole (ABILIFY) 10 MG tablet; Take 0.5 tablets (5 mg) by mouth daily.      Follow up: 1 month       HIGH RISK MEDICATION:  No        ADMINISTRATIVE BILLING:   The longitudinal plan of care for the diagnosis(es)/condition(s) as documented were addressed during this visit. Due to the added complexity in care, I will continue to support Alexia Estrada in the subsequent management and with ongoing continuity of care.       Patient Status:  Patient will continue to be seen for ongoing consultation and stabilization.    Signed:   Darrell Springer PA-C

## 2024-11-19 ENCOUNTER — OFFICE VISIT (OUTPATIENT)
Dept: PSYCHOLOGY | Facility: CLINIC | Age: 34
End: 2024-11-19
Payer: COMMERCIAL

## 2024-11-19 DIAGNOSIS — F52.0 HYPOACTIVE SEXUAL DESIRE: ICD-10-CM

## 2024-11-19 DIAGNOSIS — F41.9 ANXIETY DISORDER, UNSPECIFIED TYPE: ICD-10-CM

## 2024-11-19 DIAGNOSIS — F31.75 BIPOLAR 1 DISORDER, DEPRESSED, PARTIAL REMISSION (H): ICD-10-CM

## 2024-11-19 DIAGNOSIS — F52.31 ANORGASMIA OF FEMALE: Primary | ICD-10-CM
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
## 2024-11-20 NOTE — PROGRESS NOTES
I did not personally see the patient but I have reviewed and agree with the assessment and plan as documented in this note.  Soniya Jackson, PhD -- Supervisor   Licensed Psychologist

## 2024-12-10 ENCOUNTER — OFFICE VISIT (OUTPATIENT)
Dept: PSYCHOLOGY | Facility: CLINIC | Age: 34
End: 2024-12-10
Payer: COMMERCIAL

## 2024-12-10 DIAGNOSIS — F41.9 ANXIETY DISORDER, UNSPECIFIED TYPE: ICD-10-CM

## 2024-12-10 DIAGNOSIS — F52.0 HYPOACTIVE SEXUAL DESIRE: ICD-10-CM

## 2024-12-10 DIAGNOSIS — F52.31 ANORGASMIA OF FEMALE: Primary | ICD-10-CM

## 2024-12-10 DIAGNOSIS — F31.75 BIPOLAR 1 DISORDER, DEPRESSED, PARTIAL REMISSION (H): ICD-10-CM

## 2024-12-10 PROCEDURE — 90837 PSYTX W PT 60 MINUTES: CPT | Mod: HN
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
## 2024-12-12 ENCOUNTER — MYC MEDICAL ADVICE (OUTPATIENT)
Dept: PSYCHIATRY | Facility: CLINIC | Age: 34
End: 2024-12-12
Payer: COMMERCIAL

## 2024-12-12 ENCOUNTER — OFFICE VISIT (OUTPATIENT)
Dept: PSYCHIATRY | Facility: CLINIC | Age: 34
End: 2024-12-12
Payer: COMMERCIAL

## 2024-12-12 VITALS
BODY MASS INDEX: 24.37 KG/M2 | WEIGHT: 137.6 LBS | DIASTOLIC BLOOD PRESSURE: 63 MMHG | HEART RATE: 45 BPM | SYSTOLIC BLOOD PRESSURE: 107 MMHG

## 2024-12-12 DIAGNOSIS — F31.9 BIPOLAR I DISORDER WITH DEPRESSION (H): Primary | ICD-10-CM

## 2024-12-12 DIAGNOSIS — F41.9 ANXIETY DISORDER, UNSPECIFIED TYPE: ICD-10-CM
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
## 2024-12-16 NOTE — TELEPHONE ENCOUNTER
Writer attempted to submit PA via CoverMyMeds and received this notification:        Writer called patient's pharmacy to obtain insurance information:    ID: [id number]

## 2024-12-23 ENCOUNTER — MYC MEDICAL ADVICE (OUTPATIENT)
Dept: PSYCHIATRY | Facility: CLINIC | Age: 34
End: 2024-12-23
Payer: COMMERCIAL

## 2024-12-26 NOTE — TELEPHONE ENCOUNTER
Received call from Ryan at [facility] and PA has been denied. Patient must first try clozapine and ziprasidone. They will be sending over a letter.

## 2025-05-01 ENCOUNTER — OFFICE VISIT (OUTPATIENT)
Dept: PSYCHIATRY | Facility: CLINIC | Age: 35
End: 2025-05-01
Payer: COMMERCIAL

## 2025-05-01 VITALS — DIASTOLIC BLOOD PRESSURE: 80 MMHG | HEART RATE: 61 BPM | SYSTOLIC BLOOD PRESSURE: 111 MMHG

## 2025-05-01 DIAGNOSIS — F90.9 ATTENTION DEFICIT HYPERACTIVITY DISORDER (ADHD), UNSPECIFIED ADHD TYPE: ICD-10-CM

## 2025-05-01 DIAGNOSIS — F31.9 BIPOLAR I DISORDER WITH DEPRESSION (H): Primary | ICD-10-CM

## 2025-05-01 RX ORDER — DEXTROAMPHETAMINE SACCHARATE, AMPHETAMINE ASPARTATE MONOHYDRATE, DEXTROAMPHETAMINE SULFATE AND AMPHETAMINE SULFATE 1.25; 1.25; 1.25; 1.25 MG/1; MG/1; MG/1; MG/1
5 CAPSULE, EXTENDED RELEASE ORAL DAILY
Qty: 30 CAPSULE | Refills: 0 | Status: SHIPPED | OUTPATIENT
Start: 2025-05-01

## 2025-05-01 RX ORDER — ZIPRASIDONE HYDROCHLORIDE 20 MG/1
20 CAPSULE ORAL 2 TIMES DAILY WITH MEALS
Qty: 60 CAPSULE | Refills: 1 | Status: SHIPPED | OUTPATIENT
Start: 2025-05-01

## 2025-05-01 NOTE — NURSING NOTE
Wants to talk about medication for focus like Adderall. 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     Medications and allergies reviewed with patient. Preferred pharmacy confirmed.
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
## 2025-05-03 ENCOUNTER — HEALTH MAINTENANCE LETTER (OUTPATIENT)
Age: 35
End: 2025-05-03

## 2025-05-13 ENCOUNTER — OFFICE VISIT (OUTPATIENT)
Dept: PSYCHOLOGY | Facility: CLINIC | Age: 35
End: 2025-05-13
Payer: COMMERCIAL

## 2025-05-13 DIAGNOSIS — F31.75 BIPOLAR 1 DISORDER, DEPRESSED, PARTIAL REMISSION (H): ICD-10-CM

## 2025-05-13 DIAGNOSIS — F52.0 HYPOACTIVE SEXUAL DESIRE: ICD-10-CM

## 2025-05-13 DIAGNOSIS — F52.31 ANORGASMIA OF FEMALE: Primary | ICD-10-CM

## 2025-05-13 DIAGNOSIS — F41.9 ANXIETY DISORDER, UNSPECIFIED TYPE: ICD-10-CM

## 2025-05-13 PROBLEM — F32.A ANXIETY AND DEPRESSION: Status: ACTIVE | Noted: 2022-01-10

## 2025-05-13 PROBLEM — J30.9 ALLERGIC RHINITIS: Status: ACTIVE | Noted: 2021-06-23

## 2025-05-13 PROBLEM — F11.20 FENTANYL DEPENDENCE (H): Chronic | Status: ACTIVE | Noted: 2024-05-30

## 2025-05-13 PROBLEM — F43.22 ADJUSTMENT DISORDER WITH ANXIETY: Status: ACTIVE | Noted: 2021-12-01

## 2025-05-13 PROBLEM — F14.10 COCAINE USE DISORDER (H): Status: ACTIVE | Noted: 2023-04-15

## 2025-05-13 PROBLEM — N73.1 CHRONIC PID (CHRONIC PELVIC INFLAMMATORY DISEASE): Status: ACTIVE | Noted: 2023-05-03

## 2025-05-13 PROBLEM — Z86.59 HISTORY OF SUICIDAL IDEATION: Status: ACTIVE | Noted: 2023-04-15

## 2025-05-13 PROBLEM — Z00.00 VISIT FOR PREVENTIVE HEALTH EXAMINATION: Status: RESOLVED | Noted: 2024-04-09 | Resolved: 2025-05-13

## 2025-05-13 PROBLEM — N94.10 DYSPAREUNIA, FEMALE: Status: ACTIVE | Noted: 2022-01-10

## 2025-05-13 PROBLEM — F31.72 BIPOLAR DISORDER, IN FULL REMISSION, MOST RECENT EPISODE HYPOMANIC: Status: ACTIVE | Noted: 2021-03-04

## 2025-05-13 PROBLEM — F90.9 ATTENTION DEFICIT HYPERACTIVITY DISORDER (ADHD), UNSPECIFIED ADHD TYPE: Status: ACTIVE | Noted: 2021-03-04

## 2025-05-13 PROCEDURE — 90837 PSYTX W PT 60 MINUTES: CPT | Mod: HN
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
## 2025-05-27 ENCOUNTER — OFFICE VISIT (OUTPATIENT)
Dept: OBGYN | Facility: CLINIC | Age: 35
End: 2025-05-27
Attending: REGISTERED NURSE
Payer: COMMERCIAL

## 2025-05-27 ENCOUNTER — LAB (OUTPATIENT)
Dept: LAB | Facility: CLINIC | Age: 35
End: 2025-05-27
Attending: REGISTERED NURSE
Payer: COMMERCIAL

## 2025-05-27 ENCOUNTER — OFFICE VISIT (OUTPATIENT)
Dept: PSYCHOLOGY | Facility: CLINIC | Age: 35
End: 2025-05-27
Payer: COMMERCIAL

## 2025-05-27 VITALS
WEIGHT: 139.1 LBS | DIASTOLIC BLOOD PRESSURE: 86 MMHG | HEART RATE: 63 BPM | SYSTOLIC BLOOD PRESSURE: 129 MMHG | HEIGHT: 63 IN | BODY MASS INDEX: 24.64 KG/M2

## 2025-05-27 DIAGNOSIS — Z13.1 SCREENING FOR DIABETES MELLITUS: ICD-10-CM

## 2025-05-27 DIAGNOSIS — F52.0 HYPOACTIVE SEXUAL DESIRE: ICD-10-CM

## 2025-05-27 DIAGNOSIS — R03.0 ELEVATED BLOOD PRESSURE READING WITHOUT DIAGNOSIS OF HYPERTENSION: ICD-10-CM

## 2025-05-27 DIAGNOSIS — Z11.3 SCREENING EXAMINATION FOR STI: ICD-10-CM

## 2025-05-27 DIAGNOSIS — Z13.29 SCREENING FOR THYROID DISORDER: ICD-10-CM

## 2025-05-27 DIAGNOSIS — N89.8 VAGINAL ITCHING: ICD-10-CM

## 2025-05-27 DIAGNOSIS — F31.75 BIPOLAR 1 DISORDER, DEPRESSED, PARTIAL REMISSION (H): ICD-10-CM

## 2025-05-27 DIAGNOSIS — F52.31 ANORGASMIA OF FEMALE: Primary | ICD-10-CM

## 2025-05-27 DIAGNOSIS — F41.9 ANXIETY DISORDER, UNSPECIFIED TYPE: ICD-10-CM

## 2025-05-27 DIAGNOSIS — Z00.00 VISIT FOR PREVENTIVE HEALTH EXAMINATION: ICD-10-CM

## 2025-05-27 DIAGNOSIS — Z00.00 VISIT FOR PREVENTIVE HEALTH EXAMINATION: Primary | ICD-10-CM

## 2025-05-27 PROBLEM — F11.20 FENTANYL DEPENDENCE (H): Chronic | Status: RESOLVED | Noted: 2024-05-30 | Resolved: 2025-05-27

## 2025-05-27 PROBLEM — F14.10 COCAINE USE DISORDER (H): Status: RESOLVED | Noted: 2023-04-15 | Resolved: 2025-05-27

## 2025-05-27 LAB
ALBUMIN SERPL BCG-MCNC: 4.1 G/DL (ref 3.5–5.2)
ALP SERPL-CCNC: 59 U/L (ref 40–150)
ALT SERPL W P-5'-P-CCNC: 11 U/L (ref 0–50)
ANION GAP SERPL CALCULATED.3IONS-SCNC: 11 MMOL/L (ref 7–15)
AST SERPL W P-5'-P-CCNC: 21 U/L (ref 0–45)
BACTERIAL VAGINOSIS VAG-IMP: NEGATIVE
BILIRUB SERPL-MCNC: 0.4 MG/DL
BUN SERPL-MCNC: 9.6 MG/DL (ref 6–20)
CALCIUM SERPL-MCNC: 8.8 MG/DL (ref 8.8–10.4)
CANDIDA DNA VAG QL NAA+PROBE: NOT DETECTED
CANDIDA GLABRATA / CANDIDA KRUSEI DNA: NOT DETECTED
CHLORIDE SERPL-SCNC: 102 MMOL/L (ref 98–107)
CREAT SERPL-MCNC: 0.64 MG/DL (ref 0.51–0.95)
EGFRCR SERPLBLD CKD-EPI 2021: >90 ML/MIN/1.73M2
EST. AVERAGE GLUCOSE BLD GHB EST-MCNC: 94 MG/DL
GLUCOSE SERPL-MCNC: 89 MG/DL (ref 70–99)
HBA1C MFR BLD: 4.9 %
HBV SURFACE AG SERPL QL IA: NONREACTIVE
HCO3 SERPL-SCNC: 26 MMOL/L (ref 22–29)
HCV AB SERPL QL IA: NONREACTIVE
HIV 1+2 AB+HIV1 P24 AG SERPL QL IA: NONREACTIVE
POTASSIUM SERPL-SCNC: 3.7 MMOL/L (ref 3.4–5.3)
PROT SERPL-MCNC: 7.2 G/DL (ref 6.4–8.3)
SODIUM SERPL-SCNC: 139 MMOL/L (ref 135–145)
T PALLIDUM AB SER QL: NONREACTIVE
T VAGINALIS DNA VAG QL NAA+PROBE: NOT DETECTED
TSH SERPL DL<=0.005 MIU/L-ACNC: 1.57 UIU/ML (ref 0.3–4.2)

## 2025-05-27 PROCEDURE — 99213 OFFICE O/P EST LOW 20 MIN: CPT | Performed by: REGISTERED NURSE

## 2025-05-27 PROCEDURE — 82040 ASSAY OF SERUM ALBUMIN: CPT

## 2025-05-27 PROCEDURE — 3044F HG A1C LEVEL LT 7.0%: CPT | Performed by: REGISTERED NURSE

## 2025-05-27 PROCEDURE — 86803 HEPATITIS C AB TEST: CPT

## 2025-05-27 PROCEDURE — 81515 NFCT DS BV&VAGINITIS DNA ALG: CPT | Performed by: REGISTERED NURSE

## 2025-05-27 PROCEDURE — 87340 HEPATITIS B SURFACE AG IA: CPT

## 2025-05-27 PROCEDURE — 250N000011 HC RX IP 250 OP 636

## 2025-05-27 PROCEDURE — 83036 HEMOGLOBIN GLYCOSYLATED A1C: CPT

## 2025-05-27 PROCEDURE — 90837 PSYTX W PT 60 MINUTES: CPT | Mod: HN

## 2025-05-27 PROCEDURE — 3074F SYST BP LT 130 MM HG: CPT | Performed by: REGISTERED NURSE

## 2025-05-27 PROCEDURE — 90715 TDAP VACCINE 7 YRS/> IM: CPT

## 2025-05-27 PROCEDURE — 36415 COLL VENOUS BLD VENIPUNCTURE: CPT

## 2025-05-27 PROCEDURE — 3079F DIAST BP 80-89 MM HG: CPT | Performed by: REGISTERED NURSE

## 2025-05-27 PROCEDURE — 86780 TREPONEMA PALLIDUM: CPT

## 2025-05-27 PROCEDURE — 99395 PREV VISIT EST AGE 18-39: CPT | Performed by: REGISTERED NURSE

## 2025-05-27 PROCEDURE — 87389 HIV-1 AG W/HIV-1&-2 AB AG IA: CPT

## 2025-05-27 PROCEDURE — 84443 ASSAY THYROID STIM HORMONE: CPT

## 2025-05-27 PROCEDURE — 87591 N.GONORRHOEAE DNA AMP PROB: CPT | Performed by: REGISTERED NURSE

## 2025-05-27 PROCEDURE — 90471 IMMUNIZATION ADMIN: CPT

## 2025-05-27 RX ORDER — CETIRIZINE HYDROCHLORIDE 10 MG/1
10 TABLET ORAL DAILY
COMMUNITY
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
## 2025-05-27 NOTE — LETTER
"2025       RE: Alexia Delvalle  [address] [facility], [place] [address]     Dear Colleague,    Thank you for referring your patient, Alexia Delvalle, to the [facility] [place] [facility]. Please see a copy of my visit note below.      Progress Note    SUBJECTIVE:  Alexia Delvalle is an 35 year old, , who requests an Annual Preventive Exam.     GYN history:   LMP: 2025  Regular menses? Yes. Menses every 26-28 days. Length of menses: 7 days  Sexually active? Not currently. Last sexually activity April/May of this year. Male partner last.   STI screening? No concerns. Is agreeable to gc/ct today and would like full STI panel.  Contraception? none.     Cervical cancer screening: Last pap 3/26/2024 NILM, HPV negative. Dye 3/2027    Age-based screening recommendations:   Cholesterol screening: Lipid panel WNL 3/26/2024  Diabetes screening: Hgb A1c  Thyroid disease screening: TSH w/reflex T4    Concerns today include:   - vaginal symptoms: has hx of BV, but she feels something is \"off\". Doesn't have odor. Some mild itching. Uses unscented soaps, wears cotton-lined underwear, showers right after the gym.     - Going to the gym 6 days per week.   - Hx of LUZ, but has been in remission for a few years. Quit on her own!     Menstrual History:      10/10/2024    11:00 AM 2024     8:30 AM 2025     4:00 PM   Menstrual History   LAST MENSTRUAL PERIOD  10/17/2024 2025       Information is confidential and restricted. Go to Review Flowsheets to unlock data.       Last  No results found for: \"PAP\"  History of abnormal Pap smear: YES - reflected in Problem List and Health Maintenance accordingly    Last   Lab Results   Component Value Date    HPV16 Negative 2024     Last   Lab Results   Component Value Date    HPV18 Negative 2024     Last   Lab Results   Component Value Date    HRHPV Negative 2024 "       Mammogram current: not applicable  Last Mammogram:   No results found.     Last Colonoscopy:  No results found for this or any previous visit.      HISTORY:  Current Outpatient Medications   Medication Sig Dispense Refill     amphetamine-dextroamphetamine (ADDERALL XR) 20 MG 24 hr capsule Take 1 capsule (20 mg) by mouth daily. 30 capsule 0     cetirizine (ZYRTEC) 10 MG tablet Take 10 mg by mouth daily.       Multiple Vitamin (MULTIVITAMIN ADULT PO) Take 1 tablet by mouth daily. With iron and folate       ziprasidone (GEODON) 20 MG capsule 1 capsule (20 mg) every morning.       No current facility-administered medications for this visit.     Allergies   Allergen Reactions     Animal Dander      Immunization History   Administered Date(s) Administered     COVID-19 12+ (MODERNA) 2023, 2024     COVID-19 MONOVALENT 12+ (Pfizer) 2021, 2021, 2021     Flu, Unspecified 2023     HIB, Unspecified 1992, 1992, 1992, 1993     HPV Quadrivalent 2007, 2007, 2007     HepB, Unspecified 1992, 1992     Hepatitis B, Peds (Engerix-B/Recombivax HB) 1992, 1992, 1992, 1992, 2000     Historic Hib Hib-titer 1992, 1992, 1992, 1993     Historical DTP/aP 1992, 1992, 1992, 1993, 1996     Influenza (IIV3) PF 2001, 2002, 10/28/2003, 11/15/2006, 2007, 2008     Influenza Vaccine >6 months,quad, PF 2023     Influenza, Split Virus, Trivalent, Pf (Fluzone\Fluarix) 2024     MMR (MMRII) 1993, 2000     Meningococcal ACWY (Menactra ) 2007     OPV, trivalent, live 1992, 1992, 1993, 1993, 1996     TDAP (Adacel,Boostrix) 2008, 2025     Td (Adult), Adsorbed 2003       OB History    Para Term  AB Living   0 0 0 0 0 0   SAB IAB Ectopic Multiple Live Births   0 0 0 0 0      Past Medical History:   Diagnosis Date     Anorgasmia of female      Breast cyst, left     6th grade removed it     Chlamydia May 2022    Treated     Cocaine use disorder (H) 04/15/2023     Depressive disorder 2008    Since high school     Fentanyl dependence (H) 2024     fibroadenoma 2006    Fibroadenoma removed in right breast in  i think/cyst size of golf ball in left breast removed when i was in the 6th grade     Migraines      PID (pelvic inflammatory disease) 2023    Mild/due to yeast infection or bv/treated at urgent care     Retinal detachment 2022    At an eye exam the optometrist discovered that my retinas were tearing in both eyes     Retinal tear of both eyes       surgically repaired     Substance use     per chart 2023 cocaine, fentanyl     Urinary tract infection     Had this a few times over the years/treated     Past Surgical History:   Procedure Laterality Date     BREAST SURGERY      Fibroadenoma removed in right breast in  i think/cyst size of golf ball in left breast removed when i was in the 6th grade     EYE SURGERY  2020    Retinas were testing in both eyes and lasered to repair     RETINAL REATTACHMENT  2022    I had eye surgery to repair my retinas at the beginning of 2022     Family History   Problem Relation Age of Onset     Macular Degeneration Mother      Hypertension Mother      Eye Disorder Father         glasses-wearing     Glaucoma Maternal Grandmother      Hyperlipidemia Maternal Grandfather      Mental Illness Cousin         Severe Bipolar     Mental Illness Cousin         Severe Bipolar     Diabetes Other         Uncle     Kidney failure Maternal Uncle 70 - 79     Social History     Socioeconomic History     Marital status: Single   Tobacco Use     Smoking status: Former     Current packs/day: 0.00     Types: Cigarettes     Start date: 5/15/2023     Quit date: 2023     Years since quittin.4     Passive  exposure: Never     Smokeless tobacco: Never     Tobacco comments:     Every blue moon/completely stopped   Substance and Sexual Activity     Alcohol use: Not Currently     Drug use: Not Currently     Types: Cocaine, Fentanyl     Comment: No longer using any substances.     Sexual activity: Not Currently     Partners: Male     Birth control/protection: Abstinence, Pull-out method, Other, None     Social Drivers of Health     Financial Resource Strain: Low Risk  (11/10/2024)    Financial Resource Strain      Within the past 12 months, have you or your family members you live with been unable to get utilities (heat, electricity) when it was really needed?: No   Food Insecurity: High Risk (11/10/2024)    Food Insecurity      Within the past 12 months, did you worry that your food would run out before you got money to buy more?: No      Within the past 12 months, did the food you bought just not last and you didn t have money to get more?: Yes   Transportation Needs: Low Risk  (11/10/2024)    Transportation Needs      Within the past 12 months, has lack of transportation kept you from medical appointments, getting your medicines, non-medical meetings or appointments, work, or from getting things that you need?: No   Physical Activity: Sufficiently Active (11/10/2024)    Exercise Vital Sign      Days of Exercise per Week: 6 days      Minutes of Exercise per Session: 150+ min   Stress: No Stress Concern Present (11/10/2024)    [place] [employer] - Occupational Stress Questionnaire      Feeling of Stress : Only a little   Social Connections: Unknown (11/10/2024)    Social Connection and Isolation Panel [NHANES]      Frequency of Social Gatherings with Friends and Family: Never   Housing Stability: Low Risk  (11/10/2024)    Housing Stability      Do you have housing? : Yes      Are you worried about losing your housing?: No       ROS  Negative other than noted in HPI      7/9/2024    10:46 AM 9/4/2024  "    7:55 AM 5/14/2025    11:19 AM   PHQ-9 SCORE   PHQ-9 Total Score MyChart 6 (Mild depression) 5 (Mild depression) 8 (Mild depression)   PHQ-9 Total Score 6 5 8        Patient-reported          No data to display                  EXAM:  Blood pressure 129/86, pulse 63, height 1.588 m (5' 2.5\"), weight 63.1 kg (139 lb 1.6 oz), last menstrual period 05/12/2025, not currently breastfeeding. Body mass index is 25.04 kg/m .  General - pleasant female in no acute distress.  Skin - no suspicious lesions or rashes  EENT-  PERRLA, euthyroid with out palpable nodules  Neck - supple without lymphadenopathy.  Lungs - clear to auscultation bilaterally.  Heart - regular rate and rhythm without murmur.  Abdomen - soft, nontender, nondistended, no masses or organomegaly noted.  Musculoskeletal - no gross deformities.  Neurological - normal strength, sensation, and mental status.    Breast Exam:  Breast: Without visible skin changes. No dimpling or lesions seen.   Breasts supple, non-tender with palpation, no dominant mass, nodularity, or nipple discharge noted bilaterally. Axillary nodes negative.      Pelvic Exam:  EG/BUS: Normal genital architecture without lesions, erythema or abnormal secretions Bartholin's, Urethra, [place]'s normal   Urethral meatus: normal   Urethra: no masses, tenderness, or scarring   Vagina: moist, pink, rugae with creamy, white, and odorless secretions. Swabs collected.  Uterus: bimanual exam deferred. No concerns.   Rectum:anus normal       ASSESSMENT:  Encounter Diagnoses   Name Primary?     Visit for preventive health examination Yes     Screening examination for STI      Screening for thyroid disorder      Screening for diabetes mellitus      Vaginal itching      Elevated blood pressure reading without diagnosis of hypertension         PLAN:   Orders Placed This Encounter   Procedures     TDAP VACCINE (Adacel, Boostrix)  [[id number]]     Hemoglobin A1c     TSH with free T4 reflex     Hepatitis B surface " antigen     HIV Antigen Antibody Combo     Hepatitis C antibody     Treponema Abs w Reflex to RPR and Titer     Comprehensive metabolic panel     Chlamydia trachomatis/Neisseria gonorrhoeae by PCR     Multiplex Vaginal Panel by PCR     Orders Placed This Encounter   Medications     cetirizine (ZYRTEC) 10 MG tablet     Sig: Take 10 mg by mouth daily.     - Elevated initial BP today with normal repeat at the end of visit. Encouraged continued daily exercise.  - Tdap given today  - Sent to lab for STI screening panel, CMP, Hgb A1c and TSH w/reflex. Will follow up based on results.   - Multiplex swab collected today for vaginal itching. Will follow up on results. Reviewed vulvar and vaginal hygiene routine.     Discussed calcium intake, vitamins and supplements including Vitamin D. Continue prenatal vitamin.   Exercise, routine breast self-awareness encouraged.    Return to clinic in one year.  Follow-up as needed.    RICO Viera CNM            Again, thank you for allowing me to participate in the care of your patient.      Sincerely,    RICO Viera CNM

## 2025-05-27 NOTE — PATIENT INSTRUCTIONS
Thank you for trusting us with your care!   Please be aware, if you are on Mychart, you may see your results prior to your providers review. If labs are abnormal, we will call or message you on Mychart with a follow up plan.    If you need to contact us for questions about:  Symptoms, Scheduling & Medical Questions; Non-urgent (2-3 day response) Mychart message, Urgent (needing response today) [phone] (if after 3:30pm next day response)   Prescriptions: Please call your Pharmacy   Billing: Rasheed [phone] or [facility]:[phone]    PREVENTIVE HEALTH RECOMMENDATIONS:   Most women need a yearly breast and pelvic exam.    A PAP screen, a test done DURING a pelvic exam, is NO longer recommended yearly.    March 2013, screening guidelines recommended by ACOG for PAP screen are:    1) First pap at age 21.    2) Pap every 3 years until age 30.    3) After age 30, pap every 3 years or Pap with HR HPV screen every 5 years until age 65.  4) Women do NOT need a vaginal Pap screen after a hysterectomy (surgical removal of the uterus) when they have not had cancer.    Exceptions:  1) Yearly pap if HIV+ or immunosuppressed secondary to organ transplant  2) SHARON II-III continue routine screening for 20 years.    I encourage you continue looking for opportunities to choose a healthy lifestyle:       * Choose to eat a heart healthy diet. Check out the FOOD PLATE guidelines at: http://www.choosemyplate.gov/ for helpful hints on weight and cholesterol management.  Balance your caloric intake with exercise to maintain a BMI in the 22 to 26 range. For bone health: Eat calcium-rich foods like yogurt, broccoli or take chewable calcium pills (500 to 600 mg) twice a day with food.       * Exercise for at least an average of 30 minutes a day, 5 days of the week. This will help you control your weight, release stress, and help prevent disease.      * Take a Vitamin D3 supplement daily fall through spring and during summer  unless you [id number]' full body sun exposure to skin without sunscreen.      * DO wear sunscreen to prevent skin cancer after the first 15-30 minutes.      * Identify stressors in your life, find ways to release the stress, and, make time for yourself. PLEASE ask for help if mood changes last longer than two weeks.     * Limit alcohol to one drink per day.  No smoking.  Avoid second hand smoke. If you smoke, ask for help to stop.       *  If you are in a sexual relationship, talk with your partner about possible infection risks and take action to protect yourself from exposure to a sexual infection.    Please request an infection screen for STIs (sexually transmitted infections) if you are less than age 26 OR believe that you may be at risk.     Get a flu shot each year. Get a tetanus shot every 10 years. EVERYONE needs a pertussis (Whooping cough) booster.    See your dentist twice a year for an exam and preventive care cleaning.     Consider the following screen tests:    1) cholesterol test every 5 years.     2) yearly mammogram after age 40 unless you have identified risks.    3) colonoscopy every 10 years after age 50 unless you have identified risks.    4) diabetes blood test screening if you are at risk for diabetes.      Additional information that you may also find helpful:  The Internet now gives us access to LOTS of information -- some of it helpful, research documented and also plenty of harmful, anecdotal information that may not pertain to your situtaion. Consider visiting the following websites for accurate health information:    www.vitamindcouncil.org/ : Info and ongoing research re Vitamin D    www.fairview.org : Up to date and easily searchable information on multiple topics.    www.medlineplus.gov : medication info, interactive tutorials, watch real surgeries online    www.cdc.gov : public health info, travel advisories, epidemics (H1N1)    www.roger/std.org: current research re diagnosis,  treatment and prevention of sexually contacted infections.    www.health.[facility].[place] : [place] dept of heatl, public health issues in [place], N1N1    www.familydoctor.org : good info from the Academy of Family Physicians

## 2025-05-27 NOTE — PROGRESS NOTES
"  Progress Note    SUBJECTIVE:  Alexia Delvalle is an 35 year old, , who requests an Annual Preventive Exam.     GYN history:   LMP: 2025  Regular menses? Yes. Menses every 26-28 days. Length of menses: 7 days  Sexually active? Not currently. Last sexually activity April/May of this year. Male partner last.   STI screening? No concerns. Is agreeable to gc/ct today and would like full STI panel.  Contraception? none.     Cervical cancer screening: Last pap 3/26/2024 NILM, HPV negative. Dye 3/2027    Age-based screening recommendations:   Cholesterol screening: Lipid panel WNL 3/26/2024  Diabetes screening: Hgb A1c  Thyroid disease screening: TSH w/reflex T4    Concerns today include:   - vaginal symptoms: has hx of BV, but she feels something is \"off\". Doesn't have odor. Some mild itching. Uses unscented soaps, wears cotton-lined underwear, showers right after the gym.     - Going to the gym 6 days per week.   - Hx of LUZ, but has been in remission for a few years. Quit on her own!     Menstrual History:      10/10/2024    11:00 AM 2024     8:30 AM 2025     4:00 PM   Menstrual History   LAST MENSTRUAL PERIOD  10/17/2024 2025       Information is confidential and restricted. Go to Review Flowsheets to unlock data.       Last  No results found for: \"PAP\"  History of abnormal Pap smear: YES - reflected in Problem List and Health Maintenance accordingly    Last   Lab Results   Component Value Date    HPV16 Negative 2024     Last   Lab Results   Component Value Date    HPV18 Negative 2024     Last   Lab Results   Component Value Date    HRHPV Negative 2024       Mammogram current: not applicable  Last Mammogram:   No results found.     Last Colonoscopy:  No results found for this or any previous visit.      HISTORY:  Current Outpatient Medications   Medication Sig Dispense Refill    amphetamine-dextroamphetamine (ADDERALL XR) 20 MG 24 hr capsule Take 1 capsule (20 mg) by " mouth daily. 30 capsule 0    cetirizine (ZYRTEC) 10 MG tablet Take 10 mg by mouth daily.      Multiple Vitamin (MULTIVITAMIN ADULT PO) Take 1 tablet by mouth daily. With iron and folate      ziprasidone (GEODON) 20 MG capsule 1 capsule (20 mg) every morning.       No current facility-administered medications for this visit.     Allergies   Allergen Reactions    Animal Dander      Immunization History   Administered Date(s) Administered    COVID-19 12+ (MODERNA) 2023, 2024    COVID-19 MONOVALENT 12+ (Pfizer) 2021, 2021, 2021    Flu, Unspecified 2023    HIB, Unspecified 1992, 1992, 1992, 1993    HPV Quadrivalent 2007, 2007, 2007    HepB, Unspecified 1992, 1992    Hepatitis B, Peds (Engerix-B/Recombivax HB) 1992, 1992, 1992, 1992, 2000    Historic Hib Hib-titer 1992, 1992, 1992, 1993    Historical DTP/aP 1992, 1992, 1992, 1993, 1996    Influenza (IIV3) PF 2001, 2002, 10/28/2003, 11/15/2006, 2007, 2008    Influenza Vaccine >6 months,quad, PF 2023    Influenza, Split Virus, Trivalent, Pf (Fluzone\Fluarix) 2024    MMR (MMRII) 1993, 2000    Meningococcal ACWY (Menactra ) 2007    OPV, trivalent, live 1992, 1992, 1993, 1993, 1996    TDAP (Adacel,Boostrix) 2008, 2025    Td (Adult), Adsorbed 2003       OB History    Para Term  AB Living   0 0 0 0 0 0   SAB IAB Ectopic Multiple Live Births   0 0 0 0 0     Past Medical History:   Diagnosis Date    Anorgasmia of female     Breast cyst, left     6th grade removed it    Chlamydia May 2022    Treated    Cocaine use disorder (H) 04/15/2023    Depressive disorder 2008    Since high school    Fentanyl dependence (H) 2024    fibroadenoma 2006    Fibroadenoma removed in right breast in 2017 i  think/cyst size of golf ball in left breast removed when i was in the 6th grade    Migraines     PID (pelvic inflammatory disease) 2023    Mild/due to yeast infection or bv/treated at urgent care    Retinal detachment 2022    At an eye exam the optometrist discovered that my retinas were tearing in both eyes    Retinal tear of both eyes       surgically repaired    Substance use     per chart 2023 cocaine, fentanyl    Urinary tract infection 2012    Had this a few times over the years/treated     Past Surgical History:   Procedure Laterality Date    BREAST SURGERY      Fibroadenoma removed in right breast in 2017 i think/cyst size of golf ball in left breast removed when i was in the 6th grade    EYE SURGERY  2020    Retinas were testing in both eyes and lasered to repair    RETINAL REATTACHMENT  2022    I had eye surgery to repair my retinas at the beginning of 2022     Family History   Problem Relation Age of Onset    Macular Degeneration Mother     Hypertension Mother     Eye Disorder Father         glasses-wearing    Glaucoma Maternal Grandmother     Hyperlipidemia Maternal Grandfather     Mental Illness Cousin         Severe Bipolar    Mental Illness Cousin         Severe Bipolar    Diabetes Other         Uncle    Kidney failure Maternal Uncle 70 - 79     Social History     Socioeconomic History    Marital status: Single   Tobacco Use    Smoking status: Former     Current packs/day: 0.00     Types: Cigarettes     Start date: 5/15/2023     Quit date: 2023     Years since quittin.4     Passive exposure: Never    Smokeless tobacco: Never    Tobacco comments:     Every blue moon/completely stopped   Substance and Sexual Activity    Alcohol use: Not Currently    Drug use: Not Currently     Types: Cocaine, Fentanyl     Comment: No longer using any substances.    Sexual activity: Not Currently     Partners: Male     Birth control/protection: Abstinence, Pull-out  "method, Other, None     Social Drivers of Health     Financial Resource Strain: Low Risk  (11/10/2024)    Financial Resource Strain     Within the past 12 months, have you or your family members you live with been unable to get utilities (heat, electricity) when it was really needed?: No   Food Insecurity: High Risk (11/10/2024)    Food Insecurity     Within the past 12 months, did you worry that your food would run out before you got money to buy more?: No     Within the past 12 months, did the food you bought just not last and you didn t have money to get more?: Yes   Transportation Needs: Low Risk  (11/10/2024)    Transportation Needs     Within the past 12 months, has lack of transportation kept you from medical appointments, getting your medicines, non-medical meetings or appointments, work, or from getting things that you need?: No   Physical Activity: Sufficiently Active (11/10/2024)    Exercise Vital Sign     Days of Exercise per Week: 6 days     Minutes of Exercise per Session: 150+ min   Stress: No Stress Concern Present (11/10/2024)    [employer] [place] [employer] - Occupational Stress Questionnaire     Feeling of Stress : Only a little   Social Connections: Unknown (11/10/2024)    Social Connection and Isolation Panel [NHANES]     Frequency of Social Gatherings with Friends and Family: Never   Housing Stability: Low Risk  (11/10/2024)    Housing Stability     Do you have housing? : Yes     Are you worried about losing your housing?: No       ROS  Negative other than noted in HPI      7/9/2024    10:46 AM 9/4/2024     7:55 AM 5/14/2025    11:19 AM   PHQ-9 SCORE   PHQ-9 Total Score MyChart 6 (Mild depression) 5 (Mild depression) 8 (Mild depression)   PHQ-9 Total Score 6 5 8        Patient-reported          No data to display                  EXAM:  Blood pressure 129/86, pulse 63, height 1.588 m (5' 2.5\"), weight 63.1 kg (139 lb 1.6 oz), last menstrual period 05/12/2025, not currently " breastfeeding. Body mass index is 25.04 kg/m .  General - pleasant female in no acute distress.  Skin - no suspicious lesions or rashes  EENT-  PERRLA, euthyroid with out palpable nodules  Neck - supple without lymphadenopathy.  Lungs - clear to auscultation bilaterally.  Heart - regular rate and rhythm without murmur.  Abdomen - soft, nontender, nondistended, no masses or organomegaly noted.  Musculoskeletal - no gross deformities.  Neurological - normal strength, sensation, and mental status.    Breast Exam:  Breast: Without visible skin changes. No dimpling or lesions seen.   Breasts supple, non-tender with palpation, no dominant mass, nodularity, or nipple discharge noted bilaterally. Axillary nodes negative.      Pelvic Exam:  EG/BUS: Normal genital architecture without lesions, erythema or abnormal secretions Bartholin's, Urethra, Willey's normal   Urethral meatus: normal   Urethra: no masses, tenderness, or scarring   Vagina: moist, pink, rugae with creamy, white, and odorless secretions. Swabs collected.  Uterus: bimanual exam deferred. No concerns.   Rectum:anus normal       ASSESSMENT:  Encounter Diagnoses   Name Primary?    Visit for preventive health examination Yes    Screening examination for STI     Screening for thyroid disorder     Screening for diabetes mellitus     Vaginal itching     Elevated blood pressure reading without diagnosis of hypertension         PLAN:   Orders Placed This Encounter   Procedures    TDAP VACCINE (Adacel, Boostrix)  [[id number]]    Hemoglobin A1c    TSH with free T4 reflex    Hepatitis B surface antigen    HIV Antigen Antibody Combo    Hepatitis C antibody    Treponema Abs w Reflex to RPR and Titer    Comprehensive metabolic panel    Chlamydia trachomatis/Neisseria gonorrhoeae by PCR    Multiplex Vaginal Panel by PCR     Orders Placed This Encounter   Medications    cetirizine (ZYRTEC) 10 MG tablet     Sig: Take 10 mg by mouth daily.     - Elevated initial BP today with  normal repeat at the end of visit. Encouraged continued daily exercise.  - Tdap given today  - Sent to lab for STI screening panel, CMP, Hgb A1c and TSH w/reflex. Will follow up based on results.   - Multiplex swab collected today for vaginal itching. Will follow up on results. Reviewed vulvar and vaginal hygiene routine.     Discussed calcium intake, vitamins and supplements including Vitamin D. Continue prenatal vitamin.   Exercise, routine breast self-awareness encouraged.    Return to clinic in one year.  Follow-up as needed.    RICO Viera CNM

## 2025-05-28 ENCOUNTER — RESULTS FOLLOW-UP (OUTPATIENT)
Dept: OBGYN | Facility: CLINIC | Age: 35
End: 2025-05-28

## 2025-05-28 LAB
C TRACH DNA SPEC QL PROBE+SIG AMP: NEGATIVE
N GONORRHOEA DNA SPEC QL NAA+PROBE: NEGATIVE
SPECIMEN TYPE: NORMAL

## 2025-05-29 ENCOUNTER — OFFICE VISIT (OUTPATIENT)
Dept: PSYCHIATRY | Facility: CLINIC | Age: 35
End: 2025-05-29
Payer: COMMERCIAL

## 2025-05-29 VITALS
BODY MASS INDEX: 24.98 KG/M2 | DIASTOLIC BLOOD PRESSURE: 90 MMHG | WEIGHT: 138.8 LBS | HEART RATE: 65 BPM | SYSTOLIC BLOOD PRESSURE: 134 MMHG

## 2025-05-29 DIAGNOSIS — F31.9 BIPOLAR I DISORDER WITH DEPRESSION (H): ICD-10-CM

## 2025-05-29 DIAGNOSIS — F90.9 ATTENTION DEFICIT HYPERACTIVITY DISORDER (ADHD), UNSPECIFIED ADHD TYPE: Primary | ICD-10-CM

## 2025-05-29 PROCEDURE — 99214 OFFICE O/P EST MOD 30 MIN: CPT | Performed by: STUDENT IN AN ORGANIZED HEALTH CARE EDUCATION/TRAINING PROGRAM

## 2025-05-29 PROCEDURE — G2211 COMPLEX E/M VISIT ADD ON: HCPCS | Performed by: STUDENT IN AN ORGANIZED HEALTH CARE EDUCATION/TRAINING PROGRAM
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
## 2025-05-29 NOTE — NURSING NOTE
Sexual [facility] Gender [facility] Rooming Note      Most pressing mental health concern at this time: Med Check      Any new physical health conditions or diagnoses affecting you that we should be aware of: NO    Are you taking any recreational substances? NO    Is there any chance you are pregnant? NO  Do you use birth control? NO      Darline Triana CMA  May 29, 2025  10:04 AM

## 2025-06-10 ENCOUNTER — OFFICE VISIT (OUTPATIENT)
Dept: PSYCHOLOGY | Facility: CLINIC | Age: 35
End: 2025-06-10
Payer: COMMERCIAL

## 2025-06-10 ENCOUNTER — MYC REFILL (OUTPATIENT)
Dept: PSYCHIATRY | Facility: CLINIC | Age: 35
End: 2025-06-10
Payer: COMMERCIAL

## 2025-06-10 DIAGNOSIS — F52.31 ANORGASMIA OF FEMALE: Primary | ICD-10-CM

## 2025-06-10 DIAGNOSIS — F41.9 ANXIETY DISORDER, UNSPECIFIED TYPE: ICD-10-CM

## 2025-06-10 DIAGNOSIS — F90.9 ATTENTION DEFICIT HYPERACTIVITY DISORDER (ADHD), UNSPECIFIED ADHD TYPE: ICD-10-CM

## 2025-06-10 DIAGNOSIS — F31.75 BIPOLAR 1 DISORDER, DEPRESSED, PARTIAL REMISSION (H): ICD-10-CM

## 2025-06-10 DIAGNOSIS — F52.0 HYPOACTIVE SEXUAL DESIRE: ICD-10-CM

## 2025-06-10 PROCEDURE — 90837 PSYTX W PT 60 MINUTES: CPT | Mod: HN

## 2025-06-10 RX ORDER — DEXTROAMPHETAMINE SACCHARATE, AMPHETAMINE ASPARTATE MONOHYDRATE, DEXTROAMPHETAMINE SULFATE AND AMPHETAMINE SULFATE 5; 5; 5; 5 MG/1; MG/1; MG/1; MG/1
20 CAPSULE, EXTENDED RELEASE ORAL DAILY
Qty: 30 CAPSULE | Refills: 0 | Status: SHIPPED | OUTPATIENT
Start: 2025-06-10 | End: 2025-06-11
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
## 2025-07-08 ENCOUNTER — OFFICE VISIT (OUTPATIENT)
Dept: PSYCHOLOGY | Facility: CLINIC | Age: 35
End: 2025-07-08
Payer: COMMERCIAL

## 2025-07-08 DIAGNOSIS — F52.31 ANORGASMIA OF FEMALE: Primary | ICD-10-CM

## 2025-07-08 DIAGNOSIS — F41.9 ANXIETY DISORDER, UNSPECIFIED TYPE: ICD-10-CM

## 2025-07-08 DIAGNOSIS — F31.75 BIPOLAR 1 DISORDER, DEPRESSED, PARTIAL REMISSION (H): ICD-10-CM

## 2025-07-08 DIAGNOSIS — F52.0 HYPOACTIVE SEXUAL DESIRE: ICD-10-CM

## 2025-07-08 PROCEDURE — 90837 PSYTX W PT 60 MINUTES: CPT | Mod: HN

## 2025-07-08 ASSESSMENT — PATIENT HEALTH QUESTIONNAIRE - PHQ9
10. IF YOU CHECKED OFF ANY PROBLEMS, HOW DIFFICULT HAVE THESE PROBLEMS MADE IT FOR YOU TO DO YOUR WORK, TAKE CARE OF THINGS AT HOME, OR GET ALONG WITH OTHER PEOPLE: SOMEWHAT DIFFICULT
SUM OF ALL RESPONSES TO PHQ QUESTIONS 1-9: 11
SUM OF ALL RESPONSES TO PHQ QUESTIONS 1-9: 11
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
## 2025-07-17 ASSESSMENT — ANXIETY QUESTIONNAIRES
3. WORRYING TOO MUCH ABOUT DIFFERENT THINGS: MORE THAN HALF THE DAYS
4. TROUBLE RELAXING: MORE THAN HALF THE DAYS
5. BEING SO RESTLESS THAT IT IS HARD TO SIT STILL: SEVERAL DAYS
7. FEELING AFRAID AS IF SOMETHING AWFUL MIGHT HAPPEN: SEVERAL DAYS
IF YOU CHECKED OFF ANY PROBLEMS ON THIS QUESTIONNAIRE, HOW DIFFICULT HAVE THESE PROBLEMS MADE IT FOR YOU TO DO YOUR WORK, TAKE CARE OF THINGS AT HOME, OR GET ALONG WITH OTHER PEOPLE: SOMEWHAT DIFFICULT
GAD7 TOTAL SCORE: 12
1. FEELING NERVOUS, ANXIOUS, OR ON EDGE: MORE THAN HALF THE DAYS
GAD7 TOTAL SCORE: 12
2. NOT BEING ABLE TO STOP OR CONTROL WORRYING: SEVERAL DAYS
8. IF YOU CHECKED OFF ANY PROBLEMS, HOW DIFFICULT HAVE THESE MADE IT FOR YOU TO DO YOUR WORK, TAKE CARE OF THINGS AT HOME, OR GET ALONG WITH OTHER PEOPLE?: SOMEWHAT DIFFICULT
6. BECOMING EASILY ANNOYED OR IRRITABLE: NEARLY EVERY DAY

## 2025-07-21 ASSESSMENT — PATIENT HEALTH QUESTIONNAIRE - PHQ9
SUM OF ALL RESPONSES TO PHQ QUESTIONS 1-9: 19
SUM OF ALL RESPONSES TO PHQ QUESTIONS 1-9: 19
10. IF YOU CHECKED OFF ANY PROBLEMS, HOW DIFFICULT HAVE THESE PROBLEMS MADE IT FOR YOU TO DO YOUR WORK, TAKE CARE OF THINGS AT HOME, OR GET ALONG WITH OTHER PEOPLE: VERY DIFFICULT

## 2025-07-22 ENCOUNTER — OFFICE VISIT (OUTPATIENT)
Dept: PSYCHOLOGY | Facility: CLINIC | Age: 35
End: 2025-07-22
Payer: COMMERCIAL

## 2025-07-22 DIAGNOSIS — F41.9 ANXIETY DISORDER, UNSPECIFIED TYPE: ICD-10-CM

## 2025-07-22 DIAGNOSIS — F31.75 BIPOLAR 1 DISORDER, DEPRESSED, PARTIAL REMISSION (H): ICD-10-CM

## 2025-07-22 DIAGNOSIS — F52.31 ANORGASMIA OF FEMALE: Primary | ICD-10-CM
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
## 2025-07-29 ENCOUNTER — OFFICE VISIT (OUTPATIENT)
Dept: PSYCHOLOGY | Facility: CLINIC | Age: 35
End: 2025-07-29
Payer: COMMERCIAL

## 2025-07-29 DIAGNOSIS — F41.9 ANXIETY DISORDER, UNSPECIFIED TYPE: ICD-10-CM

## 2025-07-29 DIAGNOSIS — F52.31 ANORGASMIA OF FEMALE: Primary | ICD-10-CM

## 2025-07-29 DIAGNOSIS — F31.75 BIPOLAR 1 DISORDER, DEPRESSED, PARTIAL REMISSION (H): ICD-10-CM

## 2025-07-29 PROCEDURE — 90837 PSYTX W PT 60 MINUTES: CPT | Mod: HN
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
## 2025-07-31 ENCOUNTER — TELEPHONE (OUTPATIENT)
Dept: PSYCHIATRY | Facility: CLINIC | Age: 35
End: 2025-07-31
Payer: COMMERCIAL

## 2025-07-31 NOTE — TELEPHONE ENCOUNTER
Evelin with [facility] called to disclose a denial of medication CAPLYTA. Evelin states that preferred alternatives are as follows :    Aripiprazole  Olanzapine  Quetiapine  Risperidone  Ziprasidone    Evelin can be reached at [phone] with any further questions.    Stephanie Bradley

## 2025-08-07 ENCOUNTER — MYC REFILL (OUTPATIENT)
Dept: PSYCHIATRY | Facility: CLINIC | Age: 35
End: 2025-08-07
Payer: COMMERCIAL

## 2025-08-07 DIAGNOSIS — F90.9 ATTENTION DEFICIT HYPERACTIVITY DISORDER (ADHD), UNSPECIFIED ADHD TYPE: ICD-10-CM

## 2025-08-07 RX ORDER — DEXTROAMPHETAMINE SACCHARATE, AMPHETAMINE ASPARTATE MONOHYDRATE, DEXTROAMPHETAMINE SULFATE AND AMPHETAMINE SULFATE 6.25; 6.25; 6.25; 6.25 MG/1; MG/1; MG/1; MG/1
25 CAPSULE, EXTENDED RELEASE ORAL DAILY
Qty: 30 CAPSULE | Refills: 0 | Status: SHIPPED | OUTPATIENT
Start: 2025-08-07

## 2025-08-07 ASSESSMENT — ANXIETY QUESTIONNAIRES
3. WORRYING TOO MUCH ABOUT DIFFERENT THINGS: MORE THAN HALF THE DAYS
1. FEELING NERVOUS, ANXIOUS, OR ON EDGE: MORE THAN HALF THE DAYS
6. BECOMING EASILY ANNOYED OR IRRITABLE: NEARLY EVERY DAY
GAD7 TOTAL SCORE: 14
2. NOT BEING ABLE TO STOP OR CONTROL WORRYING: MORE THAN HALF THE DAYS
8. IF YOU CHECKED OFF ANY PROBLEMS, HOW DIFFICULT HAVE THESE MADE IT FOR YOU TO DO YOUR WORK, TAKE CARE OF THINGS AT HOME, OR GET ALONG WITH OTHER PEOPLE?: VERY DIFFICULT
5. BEING SO RESTLESS THAT IT IS HARD TO SIT STILL: MORE THAN HALF THE DAYS
IF YOU CHECKED OFF ANY PROBLEMS ON THIS QUESTIONNAIRE, HOW DIFFICULT HAVE THESE PROBLEMS MADE IT FOR YOU TO DO YOUR WORK, TAKE CARE OF THINGS AT HOME, OR GET ALONG WITH OTHER PEOPLE: VERY DIFFICULT
GAD7 TOTAL SCORE: 14
7. FEELING AFRAID AS IF SOMETHING AWFUL MIGHT HAPPEN: SEVERAL DAYS
4. TROUBLE RELAXING: MORE THAN HALF THE DAYS

## 2025-08-11 ASSESSMENT — PATIENT HEALTH QUESTIONNAIRE - PHQ9
SUM OF ALL RESPONSES TO PHQ QUESTIONS 1-9: 12
SUM OF ALL RESPONSES TO PHQ QUESTIONS 1-9: 12
10. IF YOU CHECKED OFF ANY PROBLEMS, HOW DIFFICULT HAVE THESE PROBLEMS MADE IT FOR YOU TO DO YOUR WORK, TAKE CARE OF THINGS AT HOME, OR GET ALONG WITH OTHER PEOPLE: SOMEWHAT DIFFICULT

## 2025-08-12 ENCOUNTER — OFFICE VISIT (OUTPATIENT)
Dept: PSYCHOLOGY | Facility: CLINIC | Age: 35
End: 2025-08-12
Payer: COMMERCIAL

## 2025-08-12 DIAGNOSIS — F52.31 ANORGASMIA OF FEMALE: Primary | ICD-10-CM

## 2025-08-12 DIAGNOSIS — F31.75 BIPOLAR 1 DISORDER, DEPRESSED, PARTIAL REMISSION (H): ICD-10-CM

## 2025-08-12 PROCEDURE — 90837 PSYTX W PT 60 MINUTES: CPT | Mod: HN

## 2025-08-21 ENCOUNTER — OFFICE VISIT (OUTPATIENT)
Dept: PSYCHIATRY | Facility: CLINIC | Age: 35
End: 2025-08-21
Payer: COMMERCIAL

## 2025-08-21 VITALS
WEIGHT: 131 LBS | BODY MASS INDEX: 23.58 KG/M2 | DIASTOLIC BLOOD PRESSURE: 78 MMHG | HEART RATE: 58 BPM | SYSTOLIC BLOOD PRESSURE: 114 MMHG

## 2025-08-21 DIAGNOSIS — F90.9 ATTENTION DEFICIT HYPERACTIVITY DISORDER (ADHD), UNSPECIFIED ADHD TYPE: Primary | ICD-10-CM

## 2025-08-21 DIAGNOSIS — F41.9 ANXIETY DISORDER, UNSPECIFIED TYPE: ICD-10-CM

## 2025-08-21 DIAGNOSIS — F31.9 BIPOLAR I DISORDER WITH DEPRESSION (H): ICD-10-CM

## 2025-08-26 ENCOUNTER — MYC MEDICAL ADVICE (OUTPATIENT)
Dept: PSYCHIATRY | Facility: CLINIC | Age: 35
End: 2025-08-26
Payer: COMMERCIAL

## 2025-08-26 DIAGNOSIS — F31.72 BIPOLAR DISORDER, IN FULL REMISSION, MOST RECENT EPISODE HYPOMANIC: ICD-10-CM

## 2025-09-03 ENCOUNTER — MYC REFILL (OUTPATIENT)
Dept: PSYCHIATRY | Facility: CLINIC | Age: 35
End: 2025-09-03
Payer: COMMERCIAL

## 2025-09-03 DIAGNOSIS — F90.9 ATTENTION DEFICIT HYPERACTIVITY DISORDER (ADHD), UNSPECIFIED ADHD TYPE: ICD-10-CM

## 2025-09-04 RX ORDER — DEXTROAMPHETAMINE SACCHARATE, AMPHETAMINE ASPARTATE MONOHYDRATE, DEXTROAMPHETAMINE SULFATE AND AMPHETAMINE SULFATE 6.25; 6.25; 6.25; 6.25 MG/1; MG/1; MG/1; MG/1
25 CAPSULE, EXTENDED RELEASE ORAL DAILY
Qty: 30 CAPSULE | Refills: 0 | Status: SHIPPED | OUTPATIENT
Start: 2025-09-04